# Patient Record
Sex: MALE | Race: WHITE | Employment: OTHER | URBAN - METROPOLITAN AREA
[De-identification: names, ages, dates, MRNs, and addresses within clinical notes are randomized per-mention and may not be internally consistent; named-entity substitution may affect disease eponyms.]

---

## 2022-08-29 ENCOUNTER — TELEPHONE (OUTPATIENT)
Dept: ENT CLINIC | Age: 87
End: 2022-08-29

## 2022-08-29 NOTE — TELEPHONE ENCOUNTER
Pt's wife returned phone call, she stated Dr. Melvi Rothman office advised her the appt with Dr. Severo Kilts was tom for 8/30/22 at 2:45, she did not know pt had an appt today. She would like to r/s for ASAP, pt is having pain in his ear. Pt will go to either office. Rosa Virk can be reached at [phone]. Thank you.

## 2022-08-29 NOTE — TELEPHONE ENCOUNTER
Pt's wife called in to check on the status of getting an appt, advised pt's wife to give office more time.

## 2022-08-30 ENCOUNTER — OFFICE VISIT (OUTPATIENT)
Dept: ENT CLINIC | Age: 87
End: 2022-08-30
Payer: MEDICARE

## 2022-08-30 VITALS
BODY MASS INDEX: 26.48 KG/M2 | HEART RATE: 68 BPM | HEIGHT: 70 IN | SYSTOLIC BLOOD PRESSURE: 126 MMHG | WEIGHT: 185 LBS | DIASTOLIC BLOOD PRESSURE: 73 MMHG

## 2022-08-30 DIAGNOSIS — H61.21 IMPACTED CERUMEN OF RIGHT EAR: ICD-10-CM

## 2022-08-30 DIAGNOSIS — H60.11 CELLULITIS OF RIGHT EAR: Primary | ICD-10-CM

## 2022-08-30 PROCEDURE — 1036F TOBACCO NON-USER: CPT | Performed by: NURSE PRACTITIONER

## 2022-08-30 PROCEDURE — 99203 OFFICE O/P NEW LOW 30 MIN: CPT | Performed by: NURSE PRACTITIONER

## 2022-08-30 PROCEDURE — 4130F TOPICAL PREP RX AOE: CPT | Performed by: NURSE PRACTITIONER

## 2022-08-30 PROCEDURE — 1123F ACP DISCUSS/DSCN MKR DOCD: CPT | Performed by: NURSE PRACTITIONER

## 2022-08-30 PROCEDURE — G8427 DOCREV CUR MEDS BY ELIG CLIN: HCPCS | Performed by: NURSE PRACTITIONER

## 2022-08-30 PROCEDURE — G8417 CALC BMI ABV UP PARAM F/U: HCPCS | Performed by: NURSE PRACTITIONER

## 2022-08-30 PROCEDURE — 69210 REMOVE IMPACTED EAR WAX UNI: CPT | Performed by: NURSE PRACTITIONER

## 2022-08-30 RX ORDER — MULTIVIT WITH MINERALS/LUTEIN
TABLET ORAL DAILY
COMMUNITY

## 2022-08-30 RX ORDER — PREDNISONE 10 MG/1
10 TABLET ORAL DAILY
Qty: 10 TABLET | Refills: 0 | Status: SHIPPED | OUTPATIENT
Start: 2022-08-30 | End: 2022-09-09

## 2022-08-30 RX ORDER — DOXYCYCLINE HYCLATE 100 MG
100 TABLET ORAL 2 TIMES DAILY
Qty: 20 TABLET | Refills: 0 | Status: SHIPPED | OUTPATIENT
Start: 2022-08-30 | End: 2022-09-09

## 2022-08-30 RX ORDER — LEVOTHYROXINE SODIUM 0.1 MG/1
88 TABLET ORAL DAILY
COMMUNITY

## 2022-08-30 RX ORDER — OMEPRAZOLE 20 MG/1
20 CAPSULE, DELAYED RELEASE ORAL 2 TIMES DAILY
COMMUNITY

## 2022-08-30 RX ORDER — VALACYCLOVIR HYDROCHLORIDE 500 MG/1
500 TABLET, FILM COATED ORAL PRN
COMMUNITY

## 2022-08-30 ASSESSMENT — ENCOUNTER SYMPTOMS
STRIDOR: 0
RHINORRHEA: 0
SINUS PAIN: 0
FACIAL SWELLING: 1
SINUS PRESSURE: 0
RESPIRATORY NEGATIVE: 1
EYES NEGATIVE: 1
SHORTNESS OF BREATH: 0

## 2022-08-30 NOTE — TELEPHONE ENCOUNTER
Spoke to Jorge from Dr [email or web address] office . Put pt in the wrong appointment date . Changed the appointment to today 8/30/22 at 2:45 pm in [place]. Called grecia and laxmi to Rissa Hernandez and explained what happened. Called wife and expalined what happened and that he is in for today at 2:45 pm at [facility].

## 2022-08-30 NOTE — PROGRESS NOTES
[facility] Otolaryngology  Dr. Amanda Traylor. KATY De La Vega Ms.Ed. New Consult       Patient Name:  Megan Ford  :  1934     CHIEF C/O:    Chief Complaint   Patient presents with    New Patient     NP Ear swelling/inflammation. HISTORY OBTAINED FROM:  patient, spouse    HISTORY OF PRESENT ILLNESS:       Inez Gan is a 80y.o. year old male, here today for right ear swelling, infection. Symptoms for 2 weeks  Went to have ears cleaned at audiology, told right ear was swollen and could not clean  Was seen at Urgent care, placed on cortisporin drops, has a mycin allergy, ear swelled after using. Stopped drops and saw PCP, given ofloxacin drops and cefdinir, did not start drops  No change from oral antibiotics  Continues right ear swelling, muffled hearing  Pain into right side of face  Denies drainage from the right ear  No previous hx of ear infections or previous ear surgeries        Past Medical History:   Diagnosis Date    Arthritis     Hyperlipidemia      Past Surgical History:   Procedure Laterality Date    ARM SURGERY Left     BACK SURGERY N/A     KNEE SURGERY Right     TONSILLECTOMY         Current Outpatient Medications:     Ascorbic Acid (VITAMIN C) 1000 MG tablet, Take by mouth daily, Disp: , Rfl:     levothyroxine (SYNTHROID) 100 MCG tablet, Take 88 mcg by mouth Daily, Disp: , Rfl:     omeprazole (PRILOSEC) 20 MG delayed release capsule, Take 20 mg by mouth in the morning and at bedtime, Disp: , Rfl:     valACYclovir (VALTREX) 500 MG tablet, Take 500 mg by mouth as needed, Disp: , Rfl:     brompheniramine-pseudoephedrine-DM 30-2-10 MG/5ML syrup, Take 5 mLs by mouth 4 times daily as needed. , Disp: 120 mL, Rfl: 0    simvastatin (ZOCOR) 20 MG tablet, Take 20 mg by mouth nightly.  , Disp: , Rfl:     celecoxib (CELEBREX) 200 MG capsule, Take 200 mg by mouth 2 times daily.   , Disp: , Rfl:   Clindamycin/lincomycin, Erythromycin, and Pcn [penicillins]  Social History     Tobacco Use    Smoking status: Never    Smokeless tobacco: Never   Substance Use Topics    Alcohol use: Yes     Alcohol/week: 4.0 standard drinks     Types: 4 Glasses of wine per week     Comment: beer    Drug use: Never     History reviewed. No pertinent family history. Review of Systems   Constitutional: Negative. Negative for activity change and appetite change. HENT:  Positive for ear pain (Swelling) and facial swelling. Negative for congestion, ear discharge, postnasal drip, rhinorrhea, sinus pressure and sinus pain. Eyes: Negative. Respiratory: Negative. Negative for shortness of breath and stridor. Cardiovascular: Negative. Negative for chest pain and palpitations. Endocrine: Negative. Musculoskeletal: Negative. Skin: Negative. Neurological: Negative. Negative for dizziness. Hematological: Negative. Psychiatric/Behavioral: Negative. /73   Pulse 68   Ht 5' 9.5\" (1.765 m)   Wt 185 lb (83.9 kg)   BMI 26.93 kg/m²   Physical Exam  Constitutional:       Appearance: Normal appearance. HENT:      Head: Normocephalic. Right Ear: Tympanic membrane and ear canal normal. Swelling present. There is impacted cerumen. Left Ear: Tympanic membrane, ear canal and external ear normal.      Ears:        Nose: Nose normal. No rhinorrhea. Right Nostril: No occlusion. Left Nostril: No occlusion. Right Turbinates: Not pale. Left Turbinates: Not pale. Mouth/Throat:      Lips: Pink. Pharynx: Oropharynx is clear. Eyes:      Conjunctiva/sclera: Conjunctivae normal.      Pupils: Pupils are equal, round, and reactive to light. Cardiovascular:      Rate and Rhythm: Normal rate and regular rhythm. Pulses: Normal pulses. Pulmonary:      Effort: Pulmonary effort is normal. No respiratory distress. Breath sounds: No stridor. Musculoskeletal:         General: Normal range of motion. Cervical back: Normal range of motion. No rigidity. No muscular tenderness. Skin:     General: Skin is warm and dry. Neurological:      General: No focal deficit present. Mental Status: He is alert and oriented to person, place, and time. Psychiatric:         Mood and Affect: Mood normal.         Behavior: Behavior normal.         Thought Content: Thought content normal.         Judgment: Judgment normal.     Right ear:      IMPRESSION/PLAN:    Michelet Llanes was seen today for new patient. Diagnoses and all orders for this visit:    Cellulitis of right ear    Impacted cerumen of right ear  -     ID REMOVAL IMPACTED CERUMEN INSTRUMENTATION UNILAT    Other orders  -     doxycycline hyclate (VIBRA-TABS) 100 MG tablet; Take 1 tablet by mouth 2 times daily for 10 days  -     predniSONE (DELTASONE) 10 MG tablet; Take 1 tablet by mouth daily for 10 days    Patient is seen and examined today for swelling of the right ear. Upon exam there is significant swelling of the right external ear. The ear is fluctuant on palpation. Skin of the right ear is dry and scaly. Thorough examination of the right ear canal reveals complete cerumen impaction that was removed without difficulty. Patient does state significant improvement in his hearing at this time. There is no erythema or drainage in the ear canal at this time. Patient did provide verbal consent for photography of his right ear for documentation purposes. At this time he will be placed on doxycycline, 100 mg twice daily for 10 days as well as prednisone, 10 mg 1 tablet once daily for 10 days. Also recommend use of Kenalog cream that the patient already has to the external ear for dryness and swelling. He will follow-up in 2 weeks for reevaluation of his symptoms. He is instructed to call with any new or worsening of symptoms prior to his next appointment.       Evan Ho, MSN, FNP-C  [facility], Nose and Throat    The information contained in this note has been dictated using drug and medical speech recognition software and may contain errors

## 2022-09-13 ENCOUNTER — OFFICE VISIT (OUTPATIENT)
Dept: ENT CLINIC | Age: 87
End: 2022-09-13
Payer: MEDICARE

## 2022-09-13 VITALS
HEIGHT: 70 IN | DIASTOLIC BLOOD PRESSURE: 74 MMHG | BODY MASS INDEX: 25.77 KG/M2 | HEART RATE: 66 BPM | SYSTOLIC BLOOD PRESSURE: 124 MMHG | WEIGHT: 180 LBS

## 2022-09-13 DIAGNOSIS — H92.11 OTORRHEA, RIGHT EAR: Primary | ICD-10-CM

## 2022-09-13 DIAGNOSIS — H92.11 OTORRHEA, RIGHT EAR: ICD-10-CM

## 2022-09-13 DIAGNOSIS — H60.391 OTHER INFECTIVE ACUTE OTITIS EXTERNA OF RIGHT EAR: ICD-10-CM

## 2022-09-13 DIAGNOSIS — H60.11 CELLULITIS OF RIGHT EAR: ICD-10-CM

## 2022-09-13 PROCEDURE — 4130F TOPICAL PREP RX AOE: CPT | Performed by: NURSE PRACTITIONER

## 2022-09-13 PROCEDURE — 1123F ACP DISCUSS/DSCN MKR DOCD: CPT | Performed by: NURSE PRACTITIONER

## 2022-09-13 PROCEDURE — G8427 DOCREV CUR MEDS BY ELIG CLIN: HCPCS | Performed by: NURSE PRACTITIONER

## 2022-09-13 PROCEDURE — G8417 CALC BMI ABV UP PARAM F/U: HCPCS | Performed by: NURSE PRACTITIONER

## 2022-09-13 PROCEDURE — 1036F TOBACCO NON-USER: CPT | Performed by: NURSE PRACTITIONER

## 2022-09-13 PROCEDURE — 99213 OFFICE O/P EST LOW 20 MIN: CPT | Performed by: NURSE PRACTITIONER

## 2022-09-13 ASSESSMENT — ENCOUNTER SYMPTOMS
SHORTNESS OF BREATH: 0
SINUS PRESSURE: 0
EYES NEGATIVE: 1
STRIDOR: 0
SINUS PAIN: 0
RESPIRATORY NEGATIVE: 1
FACIAL SWELLING: 0
RHINORRHEA: 0

## 2022-09-13 NOTE — PROGRESS NOTES
[facility] Otolaryngology  Dr. Sheryl Huang. Abigail Alvarado. Ms.Ed        Patient Name:  Trinity Stewart  :  1934     CHIEF C/O:    Chief Complaint   Patient presents with    Follow-up     Pt states right ear is still irritated and moist        HISTORY OBTAINED FROM:  patient    HISTORY OF PRESENT ILLNESS:       Ernst Marquez is a 80y.o. year old male, here today for follow up of right ear swelling, drainage. Last seen 2 weeks ago, started on Doxy and prednisone  Does note decreased swelling and discomfort of the right ear  Right ear canal still feels itchy with drainage  No new fevers   Denies current pain  Does have ofloxacin drops at home but did not start using        Past Medical History:   Diagnosis Date    Arthritis     Hyperlipidemia      Past Surgical History:   Procedure Laterality Date    ARM SURGERY Left     BACK SURGERY N/A     KNEE SURGERY Right     TONSILLECTOMY         Current Outpatient Medications:     Ascorbic Acid (VITAMIN C) 1000 MG tablet, Take by mouth daily, Disp: , Rfl:     levothyroxine (SYNTHROID) 100 MCG tablet, Take 88 mcg by mouth Daily, Disp: , Rfl:     omeprazole (PRILOSEC) 20 MG delayed release capsule, Take 20 mg by mouth in the morning and at bedtime, Disp: , Rfl:     valACYclovir (VALTREX) 500 MG tablet, Take 500 mg by mouth as needed, Disp: , Rfl:     brompheniramine-pseudoephedrine-DM 30-2-10 MG/5ML syrup, Take 5 mLs by mouth 4 times daily as needed. , Disp: 120 mL, Rfl: 0    simvastatin (ZOCOR) 20 MG tablet, Take 20 mg by mouth nightly.  , Disp: , Rfl:     celecoxib (CELEBREX) 200 MG capsule, Take 200 mg by mouth 2 times daily. , Disp: , Rfl:   Clindamycin/lincomycin, Erythromycin, and Pcn [penicillins]  Social History     Tobacco Use    Smoking status: Never    Smokeless tobacco: Never   Substance Use Topics    Alcohol use: Yes     Alcohol/week: 4.0 standard drinks     Types: 4 Glasses of wine per week     Comment: beer    Drug use: Never     History reviewed.  No pertinent family history. Review of Systems   Constitutional: Negative. Negative for activity change and appetite change. HENT:  Positive for ear discharge. Negative for congestion, ear pain, facial swelling, postnasal drip, rhinorrhea, sinus pressure and sinus pain. Mild swelling remains on right external ear   Eyes: Negative. Respiratory: Negative. Negative for shortness of breath and stridor. Cardiovascular: Negative. Negative for chest pain and palpitations. Endocrine: Negative. Musculoskeletal: Negative. Skin: Negative. Neurological: Negative. Negative for dizziness. Hematological: Negative. Psychiatric/Behavioral: Negative. /74 (Site: Left Upper Arm, Position: Sitting, Cuff Size: Large Adult)   Pulse 66   Ht 5' 9.5\" (1.765 m)   Wt 180 lb (81.6 kg)   BMI 26.20 kg/m²   Physical Exam  Constitutional:       Appearance: Normal appearance. HENT:      Head: Normocephalic. Right Ear: Tympanic membrane and ear canal normal. Drainage and swelling present. There is impacted cerumen. Left Ear: Tympanic membrane, ear canal and external ear normal.      Ears:        Nose: Nose normal. No rhinorrhea. Right Nostril: No occlusion. Left Nostril: No occlusion. Right Turbinates: Not pale. Left Turbinates: Not pale. Mouth/Throat:      Lips: Pink. Pharynx: Oropharynx is clear. Eyes:      Conjunctiva/sclera: Conjunctivae normal.      Pupils: Pupils are equal, round, and reactive to light. Cardiovascular:      Rate and Rhythm: Normal rate and regular rhythm. Pulses: Normal pulses. Pulmonary:      Effort: Pulmonary effort is normal. No respiratory distress. Breath sounds: No stridor. Musculoskeletal:         General: Normal range of motion. Cervical back: Normal range of motion. No rigidity. No muscular tenderness. Skin:     General: Skin is warm and dry. Neurological:      General: No focal deficit present. Mental Status: He is alert and oriented to person, place, and time. Psychiatric:         Mood and Affect: Mood normal.         Behavior: Behavior normal.         Thought Content: Thought content normal.         Judgment: Judgment normal.       IMPRESSION/PLAN:    Mercedes Shabazz was seen today for follow-up. Diagnoses and all orders for this visit:    Otorrhea, right ear  -     Culture, Ear/Eye; Future    Other infective acute otitis externa of right ear    Cellulitis of right ear    At this time a culture is obtained of the right ear drainage. Patient is instructed to begin using his ofloxacin drops, 4 drops to the right ear twice daily for 7 days. He is instructed that he will be notified of culture results and any necessary medication changes. He will otherwise follow-up in 2 weeks. He is instructed to call with any new or worsening symptoms prior to his next appointment.       BRIGITTE Vasquez, FNP-C  [address] [place] [address], Nose and Throat    The information contained in this note has been dictated using drug and medical speech recognition software and may contain errors

## 2022-09-16 LAB
CULTURE EAR OR EYE: ABNORMAL
CULTURE EAR OR EYE: ABNORMAL
GRAM STAIN RESULT: ABNORMAL
ORGANISM: ABNORMAL

## 2022-09-16 NOTE — PROGRESS NOTES
Pt notified Culture returned back positive for Corynebacterium and Enterococcus infection. Notified patient to  continue with his ofloxacin drops for 14 days and notify the office for any worsening of symptoms Pt verbalized understanding and will call if worsening symptom

## 2022-09-16 NOTE — PROGRESS NOTES
Culture returned back positive for Corynebacterium and Enterococcus infection. Have patient continue with his ofloxacin drops for 14 days and notify the office for any worsening of symptoms.

## 2022-09-28 ENCOUNTER — OFFICE VISIT (OUTPATIENT)
Dept: ENT CLINIC | Age: 87
End: 2022-09-28
Payer: MEDICARE

## 2022-09-28 VITALS — BODY MASS INDEX: 25.77 KG/M2 | HEIGHT: 70 IN | WEIGHT: 180 LBS

## 2022-09-28 DIAGNOSIS — H92.11 OTORRHEA, RIGHT EAR: ICD-10-CM

## 2022-09-28 DIAGNOSIS — H60.391 OTHER INFECTIVE ACUTE OTITIS EXTERNA OF RIGHT EAR: Primary | ICD-10-CM

## 2022-09-28 PROCEDURE — G8427 DOCREV CUR MEDS BY ELIG CLIN: HCPCS | Performed by: NURSE PRACTITIONER

## 2022-09-28 PROCEDURE — 99213 OFFICE O/P EST LOW 20 MIN: CPT | Performed by: NURSE PRACTITIONER

## 2022-09-28 PROCEDURE — 1123F ACP DISCUSS/DSCN MKR DOCD: CPT | Performed by: NURSE PRACTITIONER

## 2022-09-28 PROCEDURE — 1036F TOBACCO NON-USER: CPT | Performed by: NURSE PRACTITIONER

## 2022-09-28 PROCEDURE — 4130F TOPICAL PREP RX AOE: CPT | Performed by: NURSE PRACTITIONER

## 2022-09-28 PROCEDURE — 92504 EAR MICROSCOPY EXAMINATION: CPT | Performed by: NURSE PRACTITIONER

## 2022-09-28 PROCEDURE — G8417 CALC BMI ABV UP PARAM F/U: HCPCS | Performed by: NURSE PRACTITIONER

## 2022-09-28 ASSESSMENT — ENCOUNTER SYMPTOMS
SHORTNESS OF BREATH: 0
FACIAL SWELLING: 0
EYES NEGATIVE: 1
RESPIRATORY NEGATIVE: 1
STRIDOR: 0
RHINORRHEA: 0
SINUS PAIN: 0
SINUS PRESSURE: 0

## 2022-09-28 NOTE — PROGRESS NOTES
[facility] Otolaryngology  Dr. Brenden Arrieta. Brittny Chris. Ms.Ed        Patient Name:  Yane Slater  :  1934     CHIEF C/O:    Chief Complaint   Patient presents with    Follow-up     Patient states that once and while the right ear has a minor itch but mostly cleared up. States that he feels much better. HISTORY OBTAINED FROM:  patient    HISTORY OF PRESENT ILLNESS:       Mckayla Montes De Oca is a 80y.o. year old male, here today for follow up of right otitis externa. Patient was last seen 2 weeks ago with an ear culture obtained from the right ear returning positive for corynebacterium and Enterococcus faecalis. Patient continued with his ofloxacin drops for a total of 14 days and states that his symptoms have improved. He denies any current pain or drainage from the right ear. He does complain of mild itching at this time. He states his hearing has returned to normal.  Denies any further pain or swelling to the right external ear. He is doing well at this time with no new complaints. Past Medical History:   Diagnosis Date    Arthritis     Hyperlipidemia      Past Surgical History:   Procedure Laterality Date    ARM SURGERY Left     BACK SURGERY N/A     KNEE SURGERY Right     TONSILLECTOMY         Current Outpatient Medications:     Ascorbic Acid (VITAMIN C) 1000 MG tablet, Take by mouth daily, Disp: , Rfl:     levothyroxine (SYNTHROID) 100 MCG tablet, Take 88 mcg by mouth Daily, Disp: , Rfl:     omeprazole (PRILOSEC) 20 MG delayed release capsule, Take 20 mg by mouth in the morning and at bedtime, Disp: , Rfl:     valACYclovir (VALTREX) 500 MG tablet, Take 500 mg by mouth as needed, Disp: , Rfl:     brompheniramine-pseudoephedrine-DM 30-2-10 MG/5ML syrup, Take 5 mLs by mouth 4 times daily as needed. , Disp: 120 mL, Rfl: 0    simvastatin (ZOCOR) 20 MG tablet, Take 20 mg by mouth nightly.  , Disp: , Rfl:     celecoxib (CELEBREX) 200 MG capsule, Take 200 mg by mouth 2 times daily.   , Disp: , Rfl: Clindamycin/lincomycin, Erythromycin, and Pcn [penicillins]  Social History     Tobacco Use    Smoking status: Never    Smokeless tobacco: Never   Substance Use Topics    Alcohol use: Yes     Alcohol/week: 4.0 standard drinks     Types: 4 Glasses of wine per week     Comment: beer    Drug use: Never     History reviewed. No pertinent family history. Review of Systems   Constitutional: Negative. Negative for activity change and appetite change. HENT:  Negative for congestion, ear discharge, ear pain, facial swelling, postnasal drip, rhinorrhea, sinus pressure and sinus pain. Mild swelling remains on right external ear, mild itching within right ear canal   Eyes: Negative. Respiratory: Negative. Negative for shortness of breath and stridor. Cardiovascular: Negative. Negative for chest pain and palpitations. Endocrine: Negative. Musculoskeletal: Negative. Skin: Negative. Neurological: Negative. Negative for dizziness. Hematological: Negative. Psychiatric/Behavioral: Negative. Ht 5' 9.5\" (1.765 m)   Wt 180 lb (81.6 kg)   BMI 26.20 kg/m²   Physical Exam  Constitutional:       Appearance: Normal appearance. HENT:      Head: Normocephalic. Right Ear: Tympanic membrane and ear canal normal. No drainage or swelling. There is no impacted cerumen. Left Ear: Tympanic membrane, ear canal and external ear normal.      Ears:        Nose: Nose normal. No rhinorrhea. Mouth/Throat:      Lips: Pink. Eyes:      Conjunctiva/sclera: Conjunctivae normal.      Pupils: Pupils are equal, round, and reactive to light. Cardiovascular:      Rate and Rhythm: Normal rate and regular rhythm. Pulses: Normal pulses. Pulmonary:      Effort: Pulmonary effort is normal. No respiratory distress. Breath sounds: No stridor. Musculoskeletal:         General: Normal range of motion. Cervical back: Normal range of motion. No rigidity. No muscular tenderness.    Skin: General: Skin is warm and dry. Neurological:      General: No focal deficit present. Mental Status: He is alert and oriented to person, place, and time. Psychiatric:         Mood and Affect: Mood normal.         Behavior: Behavior normal.         Thought Content: Thought content normal.         Judgment: Judgment normal.       IMPRESSION/PLAN:    Ear Microsopy:  Right ear reevaluated under microscope with small amount of cerumen and epithelial debris removed using #7 suction. Patient tolerated well. There is no further erythema or drainage at this time. Michelet Llanes was seen today for follow-up. Diagnoses and all orders for this visit:    Other infective acute otitis externa of right ear  -     ME EAR MICROSCOPY EXAMINATION    Otorrhea, right ear  -     ME EAR MICROSCOPY EXAMINATION      comfortableRight ear examined under microscope with small amount of cerumen and epithelial debris removed using suction. Patient tolerated well. At this time patient is instructed to begin using all of oil every 2 to 3 days for moisture and itching of the right ear canal.  He will also keep his remaining drops and restart for any new pain or drainage from the right ear with instructions to call the office for the symptoms. He will otherwise follow-up as needed. He is instructed to call with any new or worsening of symptoms in the future.       Evan Ho, MSN, FNP-C  [facility], Nose and Throat    The information contained in this note has been dictated using drug and medical speech recognition software and may contain errors

## 2023-01-11 ENCOUNTER — OFFICE VISIT (OUTPATIENT)
Dept: ENT CLINIC | Age: 88
End: 2023-01-11
Payer: MEDICARE

## 2023-01-11 VITALS — WEIGHT: 180 LBS | BODY MASS INDEX: 25.2 KG/M2 | HEIGHT: 71 IN

## 2023-01-11 DIAGNOSIS — H60.391 OTHER INFECTIVE ACUTE OTITIS EXTERNA OF RIGHT EAR: ICD-10-CM

## 2023-01-11 DIAGNOSIS — H60.391 OTHER INFECTIVE ACUTE OTITIS EXTERNA OF RIGHT EAR: Primary | ICD-10-CM

## 2023-01-11 PROCEDURE — G8427 DOCREV CUR MEDS BY ELIG CLIN: HCPCS | Performed by: NURSE PRACTITIONER

## 2023-01-11 PROCEDURE — 4130F TOPICAL PREP RX AOE: CPT | Performed by: NURSE PRACTITIONER

## 2023-01-11 PROCEDURE — G8420 CALC BMI NORM PARAMETERS: HCPCS | Performed by: NURSE PRACTITIONER

## 2023-01-11 PROCEDURE — 1036F TOBACCO NON-USER: CPT | Performed by: NURSE PRACTITIONER

## 2023-01-11 PROCEDURE — 99213 OFFICE O/P EST LOW 20 MIN: CPT | Performed by: NURSE PRACTITIONER

## 2023-01-11 PROCEDURE — G8484 FLU IMMUNIZE NO ADMIN: HCPCS | Performed by: NURSE PRACTITIONER

## 2023-01-11 PROCEDURE — 1123F ACP DISCUSS/DSCN MKR DOCD: CPT | Performed by: NURSE PRACTITIONER

## 2023-01-11 RX ORDER — METHYLPREDNISOLONE 4 MG/1
4 TABLET ORAL SEE ADMIN INSTRUCTIONS
Qty: 1 KIT | Refills: 0 | Status: SHIPPED | OUTPATIENT
Start: 2023-01-11 | End: 2023-01-17

## 2023-01-11 ASSESSMENT — ENCOUNTER SYMPTOMS
STRIDOR: 0
RESPIRATORY NEGATIVE: 1
SINUS PRESSURE: 0
FACIAL SWELLING: 0
SINUS PAIN: 0
EYES NEGATIVE: 1
SHORTNESS OF BREATH: 0
RHINORRHEA: 0

## 2023-01-11 NOTE — PROGRESS NOTES
[facility]  Dr. Quinten Parra. Juni Parks. Ms.Ed        Patient Name:  Claudene Colon  :  1934     CHIEF C/O:    Chief Complaint   Patient presents with    Follow-up     F/U Rt ear fluid/swelling       HISTORY OBTAINED FROM:  patient    HISTORY OF PRESENT ILLNESS:       David Watkins is a 80y.o. year old male, here today for follow up of swelling and drainage from the right ear. Patient was last seen in 2022 after an extended otitis externa and cellulitis of the right external ear. He states that 2 days ago he noticed return of drainage that is yellow in color from the right ear canal and swelling of the ear canal.  He does have discomfort of the right ear especially when the laying on the ear. He denies any significant redness of the outer ear or warmth. He does note mild swelling to the outer ear at this time. He does complain of mild muffled hearing due to the swelling of the ear canal.  He does have Cipro drops from his previous infection which she started 2 days ago but has not noticed significant improvement during that time.         Past Medical History:   Diagnosis Date    Arthritis     Hyperlipidemia      Past Surgical History:   Procedure Laterality Date    ARM SURGERY Left     BACK SURGERY N/A     KNEE SURGERY Right     TONSILLECTOMY         Current Outpatient Medications:     methylPREDNISolone (MEDROL DOSEPACK) 4 MG tablet, Take 1 tablet by mouth See Admin Instructions for 6 days Take by mouth., Disp: 1 kit, Rfl: 0    Ascorbic Acid (VITAMIN C) 1000 MG tablet, Take by mouth daily, Disp: , Rfl:     levothyroxine (SYNTHROID) 100 MCG tablet, Take 88 mcg by mouth Daily, Disp: , Rfl:     omeprazole (PRILOSEC) 20 MG delayed release capsule, Take 20 mg by mouth in the morning and at bedtime, Disp: , Rfl:     valACYclovir (VALTREX) 500 MG tablet, Take 500 mg by mouth as needed, Disp: , Rfl:     brompheniramine-pseudoephedrine-DM 30-2-10 MG/5ML syrup, Take 5 mLs by mouth 4 times daily as needed. , Disp: 120 mL, Rfl: 0    simvastatin (ZOCOR) 20 MG tablet, Take 20 mg by mouth nightly.  , Disp: , Rfl:     celecoxib (CELEBREX) 200 MG capsule, Take 200 mg by mouth 2 times daily. , Disp: , Rfl:   Clindamycin/lincomycin, Erythromycin, and Pcn [penicillins]  Social History     Tobacco Use    Smoking status: Never    Smokeless tobacco: Never   Substance Use Topics    Alcohol use: Yes     Alcohol/week: 4.0 standard drinks     Types: 4 Glasses of wine per week     Comment: beer    Drug use: Never     History reviewed. No pertinent family history. Review of Systems   Constitutional: Negative. Negative for activity change and appetite change. HENT:  Positive for ear discharge, ear pain and hearing loss (muffled right ear). Negative for congestion, facial swelling, postnasal drip, rhinorrhea, sinus pressure and sinus pain. Mild swelling remains on right external ear, mild itching within right ear canal   Eyes: Negative. Respiratory: Negative. Negative for shortness of breath and stridor. Cardiovascular: Negative. Negative for chest pain and palpitations. Endocrine: Negative. Musculoskeletal: Negative. Skin: Negative. Neurological: Negative. Negative for dizziness. Hematological: Negative. Psychiatric/Behavioral: Negative. Ht 5' 11.4\" (1.814 m)   Wt 180 lb (81.6 kg)   BMI 24.82 kg/m²   Physical Exam  Constitutional:       Appearance: Normal appearance. HENT:      Head: Normocephalic. Right Ear: Tympanic membrane and ear canal normal. Drainage and swelling present. There is no impacted cerumen. Left Ear: Tympanic membrane, ear canal and external ear normal.      Ears:        Nose: Nose normal. No rhinorrhea. Right Nostril: No occlusion. Left Nostril: No occlusion. Right Turbinates: Not pale. Left Turbinates: Not pale. Mouth/Throat:      Lips: Pink. Pharynx: Oropharynx is clear.    Eyes:      Conjunctiva/sclera: Conjunctivae normal.      Pupils: Pupils are equal, round, and reactive to light. Cardiovascular:      Rate and Rhythm: Normal rate and regular rhythm. Pulses: Normal pulses. Pulmonary:      Effort: Pulmonary effort is normal. No respiratory distress. Breath sounds: No stridor. Musculoskeletal:         General: Normal range of motion. Cervical back: Normal range of motion. No rigidity. No muscular tenderness. Skin:     General: Skin is warm and dry. Neurological:      General: No focal deficit present. Mental Status: He is alert and oriented to person, place, and time. Psychiatric:         Mood and Affect: Mood normal.         Behavior: Behavior normal.         Thought Content: Thought content normal.         Judgment: Judgment normal.       IMPRESSION/PLAN:    Maddison Rogers was seen today for follow-up. Diagnoses and all orders for this visit:    Other infective acute otitis externa of right ear  -     Culture, Ear/Eye; Future    Other orders  -     methylPREDNISolone (MEDROL DOSEPACK) 4 MG tablet; Take 1 tablet by mouth See Admin Instructions for 6 days Take by mouth. A culture was obtained of the drainage from the right ear canal and sent to lab for further evaluation. At this time an ear wick was placed within the right ear canal with 4 drops of the patient's provided Cipro drops. He will continue with Cipro drops, 4 drops twice daily until his follow-up appointment. He is instructed that if the wick falls out of the ear not to replace it but continue the drop  He is also a Medrol Dosepak to be used as directed. He is to call the office for any changes including increased swelling of the external ear with redness or dried flaking skin as in previous episode. He will otherwise follow-up in 7 to 10 days for reevaluation. He is instructed to call with any new or worsening symptoms prior to his next appointment.     Jordyn Warren, MSN, FNP-C  [address] [facility] and Throat    The information contained in this note has been dictated using drug and medical speech recognition software and may contain errors

## 2023-01-16 ENCOUNTER — TELEPHONE (OUTPATIENT)
Dept: ENT CLINIC | Age: 88
End: 2023-01-16

## 2023-01-16 NOTE — TELEPHONE ENCOUNTER
Received fax from lab, called to F/U. Lab did not receive specimen only the order? ? Specimen needs to be recollected. Can Pt just stop in office to recollect or does he need to be on schedule? Please advise.

## 2023-01-23 ENCOUNTER — PROCEDURE VISIT (OUTPATIENT)
Dept: AUDIOLOGY | Age: 88
End: 2023-01-23
Payer: MEDICARE

## 2023-01-23 ENCOUNTER — OFFICE VISIT (OUTPATIENT)
Dept: ENT CLINIC | Age: 88
End: 2023-01-23
Payer: MEDICARE

## 2023-01-23 ENCOUNTER — TELEPHONE (OUTPATIENT)
Dept: ENT CLINIC | Age: 88
End: 2023-01-23

## 2023-01-23 VITALS
BODY MASS INDEX: 25.1 KG/M2 | DIASTOLIC BLOOD PRESSURE: 75 MMHG | SYSTOLIC BLOOD PRESSURE: 125 MMHG | HEART RATE: 76 BPM | HEIGHT: 71 IN

## 2023-01-23 DIAGNOSIS — H92.11 OTORRHEA, RIGHT EAR: ICD-10-CM

## 2023-01-23 DIAGNOSIS — H93.8X1 EAR FULLNESS, RIGHT: Primary | ICD-10-CM

## 2023-01-23 DIAGNOSIS — H60.391 OTHER INFECTIVE ACUTE OTITIS EXTERNA OF RIGHT EAR: ICD-10-CM

## 2023-01-23 DIAGNOSIS — H93.8X1 SWELLING OF RIGHT EAR: Primary | ICD-10-CM

## 2023-01-23 PROCEDURE — 1123F ACP DISCUSS/DSCN MKR DOCD: CPT | Performed by: NURSE PRACTITIONER

## 2023-01-23 PROCEDURE — 92567 TYMPANOMETRY: CPT | Performed by: AUDIOLOGIST

## 2023-01-23 PROCEDURE — 99213 OFFICE O/P EST LOW 20 MIN: CPT | Performed by: NURSE PRACTITIONER

## 2023-01-23 PROCEDURE — 4130F TOPICAL PREP RX AOE: CPT | Performed by: NURSE PRACTITIONER

## 2023-01-23 PROCEDURE — G8484 FLU IMMUNIZE NO ADMIN: HCPCS | Performed by: NURSE PRACTITIONER

## 2023-01-23 PROCEDURE — G8427 DOCREV CUR MEDS BY ELIG CLIN: HCPCS | Performed by: NURSE PRACTITIONER

## 2023-01-23 PROCEDURE — 1036F TOBACCO NON-USER: CPT | Performed by: NURSE PRACTITIONER

## 2023-01-23 PROCEDURE — G8417 CALC BMI ABV UP PARAM F/U: HCPCS | Performed by: NURSE PRACTITIONER

## 2023-01-23 ASSESSMENT — ENCOUNTER SYMPTOMS
FACIAL SWELLING: 0
SINUS PAIN: 0
EYES NEGATIVE: 1
SINUS PRESSURE: 0
STRIDOR: 0
RESPIRATORY NEGATIVE: 1
SHORTNESS OF BREATH: 0
RHINORRHEA: 0

## 2023-01-23 NOTE — PROGRESS NOTES
[facility] Otolaryngology  Dr. Yolis Adler. Dot Ferrell. Ms.Ed        Patient Name:  Brennan Cardenas  :  1934     CHIEF C/O:    Chief Complaint   Patient presents with    Other     Right swollen ear       HISTORY OBTAINED FROM:  patient    HISTORY OF PRESENT ILLNESS:       Antoni Gage is a 80y.o. year old male, here today for follow up of right otitis externa and ear swelling. Patient was last seen 2 weeks ago and placed on Cipro drops and a Medrol Dosepak. An ear wick was placed within the right ear canal.  A culture was obtained from the right ear however this sample was lost prior to being received by the lab. Patient states decreased swelling and drainage from the right ear with decreased pain. He does note some continued cracking and popping from the right ear with minimal discharge. He does note decrease in the swelling in the ear canal and outer ear. He denies any recent fevers or oral antibiotics. He has no new complaints today. Past Medical History:   Diagnosis Date    Arthritis     Hyperlipidemia      Past Surgical History:   Procedure Laterality Date    ARM SURGERY Left     BACK SURGERY N/A     KNEE SURGERY Right     TONSILLECTOMY         Current Outpatient Medications:     Ascorbic Acid (VITAMIN C) 1000 MG tablet, Take by mouth daily, Disp: , Rfl:     levothyroxine (SYNTHROID) 100 MCG tablet, Take 88 mcg by mouth Daily, Disp: , Rfl:     omeprazole (PRILOSEC) 20 MG delayed release capsule, Take 20 mg by mouth in the morning and at bedtime, Disp: , Rfl:     valACYclovir (VALTREX) 500 MG tablet, Take 500 mg by mouth as needed, Disp: , Rfl:     brompheniramine-pseudoephedrine-DM 30-2-10 MG/5ML syrup, Take 5 mLs by mouth 4 times daily as needed. , Disp: 120 mL, Rfl: 0    simvastatin (ZOCOR) 20 MG tablet, Take 20 mg by mouth nightly.  , Disp: , Rfl:     celecoxib (CELEBREX) 200 MG capsule, Take 200 mg by mouth 2 times daily.   , Disp: , Rfl:   Clindamycin/lincomycin, Erythromycin, and Pcn [penicillins]  Social History     Tobacco Use    Smoking status: Never    Smokeless tobacco: Never   Substance Use Topics    Alcohol use: Yes     Alcohol/week: 4.0 standard drinks     Types: 4 Glasses of wine per week     Comment: beer    Drug use: Never     History reviewed. No pertinent family history. Review of Systems   Constitutional: Negative. Negative for activity change and appetite change. HENT:  Positive for ear discharge, ear pain and hearing loss. Negative for congestion, facial swelling, postnasal drip, rhinorrhea, sinus pressure and sinus pain. Eyes: Negative. Respiratory: Negative. Negative for shortness of breath and stridor. Cardiovascular: Negative. Negative for chest pain and palpitations. Endocrine: Negative. Musculoskeletal: Negative. Skin: Negative. Neurological: Negative. Negative for dizziness. Hematological: Negative. Psychiatric/Behavioral: Negative. /75 (Site: Left Upper Arm, Position: Sitting, Cuff Size: Large Adult)   Pulse 76   Ht 5' 11\" (1.803 m)   BMI 25.10 kg/m²   Physical Exam  Constitutional:       Appearance: Normal appearance. HENT:      Head: Normocephalic. Right Ear: Tympanic membrane and ear canal normal. Drainage present. No swelling. There is no impacted cerumen. Left Ear: Tympanic membrane, ear canal and external ear normal.      Ears:        Nose: Nose normal. No rhinorrhea. Right Nostril: No occlusion. Left Nostril: No occlusion. Right Turbinates: Not pale. Left Turbinates: Not pale. Mouth/Throat:      Lips: Pink. Pharynx: Oropharynx is clear. Eyes:      Conjunctiva/sclera: Conjunctivae normal.      Pupils: Pupils are equal, round, and reactive to light. Cardiovascular:      Rate and Rhythm: Normal rate and regular rhythm. Pulses: Normal pulses. Pulmonary:      Effort: Pulmonary effort is normal. No respiratory distress. Breath sounds: No stridor.    Musculoskeletal: General: Normal range of motion. Cervical back: Normal range of motion. No rigidity. No muscular tenderness. Skin:     General: Skin is warm and dry. Neurological:      General: No focal deficit present. Mental Status: He is alert and oriented to person, place, and time. Psychiatric:         Mood and Affect: Mood normal.         Behavior: Behavior normal.         Thought Content: Thought content normal.         Judgment: Judgment normal.       Tympanogram reviewed with patient. Reveals type A curve in the right ear, with type A curve in the left ear. IMPRESSION/PLAN:    Ear Microscopy:  Right ear canal examined under microscope. There is a moderate amount of epithelial debris, dead skin, and purulent drainage within the ear canal that was removed using #5 suction. Patient tolerated well and states improvement in his hearing. He denies significant pain or tenderness during the procedure. Kervin Perry was seen today for other. Diagnoses and all orders for this visit:    Otorrhea, right ear  -     Culture, Ear/Eye; Future    Other infective acute otitis externa of right ear    At this time patient will continue with his Cipro drops, 4 drops twice daily until notified of your culture results. A new culture was obtained of the right ear canal and sent to the laboratory for further evaluation. He will be notified of results. Patient states he is preparing to travel out of town in 1 week but will adjust based on his treatment. At this time he will plan to follow-up as needed.   He will call for any new or worsening symptoms    Brice Quinonez, BRIGITTE, FNP-C  8 [facility], Nose and Throat    The information contained in this note has been dictated using drug and medical speech recognition software and may contain errors

## 2023-01-23 NOTE — PROGRESS NOTES
This patient was referred for tympanometric testing by KENYON Washington due to external/ear canal swelling. Tympanometry revealed normal middle ear peak pressure and compliance, in the left ear and normal middle ear peak pressure and reduced compliance, right ear. The results were reviewed with the patient. Recommendations for follow up will be made pending physician consult.     Kirill Clifton CCC/GAL  Audiologist  [id number]  NPI#:  [id number]      Electronically signed by Ashley Conn on 1/23/2023 at 11:45 AM

## 2023-01-24 RX ORDER — CIPROFLOXACIN HYDROCHLORIDE 3.5 MG/ML
4 SOLUTION/ DROPS TOPICAL 2 TIMES DAILY
Qty: 1 EACH | Refills: 3 | Status: SHIPPED | OUTPATIENT
Start: 2023-01-24 | End: 2023-01-31

## 2023-01-25 ENCOUNTER — TELEPHONE (OUTPATIENT)
Dept: ENT CLINIC | Age: 88
End: 2023-01-25

## 2023-01-25 LAB
CULTURE EAR OR EYE: ABNORMAL
GRAM STAIN RESULT: ABNORMAL
ORGANISM: ABNORMAL

## 2023-01-25 RX ORDER — SULFAMETHOXAZOLE AND TRIMETHOPRIM 800; 160 MG/1; MG/1
1 TABLET ORAL 2 TIMES DAILY
Qty: 20 TABLET | Refills: 0 | Status: SHIPPED | OUTPATIENT
Start: 2023-01-25 | End: 2023-02-04

## 2023-01-25 NOTE — TELEPHONE ENCOUNTER
Your culture returned positive for staph. Patient to be placed on Bactrim, 1 tablet twice daily for 10 days. He will continue with his Cipro drops during this time. Keep follow-up as scheduled.

## 2023-01-27 ENCOUNTER — TELEPHONE (OUTPATIENT)
Dept: ENT CLINIC | Age: 88
End: 2023-01-27

## 2023-01-27 ENCOUNTER — OFFICE VISIT (OUTPATIENT)
Dept: ENT CLINIC | Age: 88
End: 2023-01-27
Payer: MEDICARE

## 2023-01-27 VITALS — BODY MASS INDEX: 25.2 KG/M2 | WEIGHT: 180 LBS | HEIGHT: 71 IN

## 2023-01-27 DIAGNOSIS — H60.391 OTHER INFECTIVE ACUTE OTITIS EXTERNA OF RIGHT EAR: Primary | ICD-10-CM

## 2023-01-27 DIAGNOSIS — H92.11 OTORRHEA, RIGHT EAR: ICD-10-CM

## 2023-01-27 PROCEDURE — G8427 DOCREV CUR MEDS BY ELIG CLIN: HCPCS | Performed by: NURSE PRACTITIONER

## 2023-01-27 PROCEDURE — 4130F TOPICAL PREP RX AOE: CPT | Performed by: NURSE PRACTITIONER

## 2023-01-27 PROCEDURE — 1036F TOBACCO NON-USER: CPT | Performed by: NURSE PRACTITIONER

## 2023-01-27 PROCEDURE — G8484 FLU IMMUNIZE NO ADMIN: HCPCS | Performed by: NURSE PRACTITIONER

## 2023-01-27 PROCEDURE — 1123F ACP DISCUSS/DSCN MKR DOCD: CPT | Performed by: NURSE PRACTITIONER

## 2023-01-27 PROCEDURE — 99213 OFFICE O/P EST LOW 20 MIN: CPT | Performed by: NURSE PRACTITIONER

## 2023-01-27 PROCEDURE — G8417 CALC BMI ABV UP PARAM F/U: HCPCS | Performed by: NURSE PRACTITIONER

## 2023-01-27 RX ORDER — MUPIROCIN CALCIUM 20 MG/G
CREAM TOPICAL
Qty: 1 EACH | Refills: 0 | Status: SHIPPED | OUTPATIENT
Start: 2023-01-27 | End: 2023-02-26

## 2023-01-27 ASSESSMENT — ENCOUNTER SYMPTOMS
SHORTNESS OF BREATH: 0
RESPIRATORY NEGATIVE: 1
RHINORRHEA: 0
SINUS PAIN: 0
EYES NEGATIVE: 1
FACIAL SWELLING: 0
SINUS PRESSURE: 0
STRIDOR: 0

## 2023-01-27 NOTE — PROGRESS NOTES
[address] [facility] Otolaryngology  Dr. Michael Linton. Jordyn Fuchs. Ms.Ed        Patient Name:  Mary Kate Pineda  :  1934     CHIEF C/O:    Chief Complaint   Patient presents with    Follow-up     Right ear culture re check. Sates that he has noticed improvements already        HISTORY OBTAINED FROM:  patient    HISTORY OF PRESENT ILLNESS:       Sebas Goodwin is a 80y.o. year old male, here today for follow up of right otitis externa. Patient was last seen 5 days ago with an ear culture returning back positive for staph. He was placed on Bactrim which she has been taking for 2 and half days and notes improvement of his symptoms already. He continues to note mild drainage from the right ear but denies any significant pain or swelling at this time. He denies any muffled hearing. He denies any new tinnitus. He states his symptoms are improving and he feels well. Past Medical History:   Diagnosis Date    Arthritis     Hyperlipidemia      Past Surgical History:   Procedure Laterality Date    ARM SURGERY Left     BACK SURGERY N/A     KNEE SURGERY Right     TONSILLECTOMY         Current Outpatient Medications:     mupirocin (BACTROBAN) 2 % cream, Apply to right ear canal twice daily for 10 days. , Disp: 1 each, Rfl: 0    sulfamethoxazole-trimethoprim (BACTRIM DS;SEPTRA DS) 800-160 MG per tablet, Take 1 tablet by mouth 2 times daily for 10 days, Disp: 20 tablet, Rfl: 0    ciprofloxacin (CILOXAN) 0.3 % ophthalmic solution, Place 4 drops in ear(s) 2 times daily for 7 days, Disp: 1 each, Rfl: 3    Ascorbic Acid (VITAMIN C) 1000 MG tablet, Take by mouth daily, Disp: , Rfl:     levothyroxine (SYNTHROID) 100 MCG tablet, Take 88 mcg by mouth Daily, Disp: , Rfl:     omeprazole (PRILOSEC) 20 MG delayed release capsule, Take 20 mg by mouth in the morning and at bedtime, Disp: , Rfl:     valACYclovir (VALTREX) 500 MG tablet, Take 500 mg by mouth as needed, Disp: , Rfl:     brompheniramine-pseudoephedrine-DM 30-2-10 MG/5ML syrup, Take 5 mLs by mouth 4 times daily as needed. , Disp: 120 mL, Rfl: 0    simvastatin (ZOCOR) 20 MG tablet, Take 20 mg by mouth nightly.  , Disp: , Rfl:     celecoxib (CELEBREX) 200 MG capsule, Take 200 mg by mouth 2 times daily. , Disp: , Rfl:   Clindamycin/lincomycin, Erythromycin, and Pcn [penicillins]  Social History     Tobacco Use    Smoking status: Never    Smokeless tobacco: Never   Substance Use Topics    Alcohol use: Yes     Alcohol/week: 4.0 standard drinks     Types: 4 Glasses of wine per week     Comment: beer    Drug use: Never     History reviewed. No pertinent family history. Review of Systems   Constitutional: Negative. Negative for activity change and appetite change. HENT:  Positive for ear discharge and hearing loss. Negative for congestion, ear pain, facial swelling, postnasal drip, rhinorrhea, sinus pressure and sinus pain. Eyes: Negative. Respiratory: Negative. Negative for shortness of breath and stridor. Cardiovascular: Negative. Negative for chest pain and palpitations. Endocrine: Negative. Musculoskeletal: Negative. Skin: Negative. Neurological: Negative. Negative for dizziness. Hematological: Negative. Psychiatric/Behavioral: Negative. Ht 5' 11\" (1.803 m)   Wt 180 lb (81.6 kg)   BMI 25.10 kg/m²   Physical Exam  Constitutional:       Appearance: Normal appearance. HENT:      Head: Normocephalic. Right Ear: Tympanic membrane and ear canal normal. Drainage present. No swelling. There is no impacted cerumen. Left Ear: Tympanic membrane, ear canal and external ear normal.      Ears:        Nose: Nose normal. No rhinorrhea. Right Nostril: No occlusion. Left Nostril: No occlusion. Right Turbinates: Not pale. Left Turbinates: Not pale. Mouth/Throat:      Lips: Pink. Pharynx: Oropharynx is clear. Eyes:      Conjunctiva/sclera: Conjunctivae normal.      Pupils: Pupils are equal, round, and reactive to light.    Cardiovascular: Rate and Rhythm: Normal rate and regular rhythm. Pulses: Normal pulses. Pulmonary:      Effort: Pulmonary effort is normal. No respiratory distress. Breath sounds: No stridor. Musculoskeletal:         General: Normal range of motion. Cervical back: Normal range of motion. No rigidity. No muscular tenderness. Skin:     General: Skin is warm and dry. Neurological:      General: No focal deficit present. Mental Status: He is alert and oriented to person, place, and time. Psychiatric:         Mood and Affect: Mood normal.         Behavior: Behavior normal.         Thought Content: Thought content normal.         Judgment: Judgment normal.       IMPRESSION/PLAN:    Afua Coronado was seen today for follow-up. Diagnoses and all orders for this visit:    Other infective acute otitis externa of right ear    Otorrhea, right ear    Other orders  -     mupirocin (BACTROBAN) 2 % cream; Apply to right ear canal twice daily for 10 days. At this time patient will continue with his Bactrim, 1 tablet twice daily until completed. He is also given Bactroban cream to apply to the right ear twice daily for 10 days. Patient is scheduled to be out of town for the next 2 to 3 months and will follow-up upon his return in approximately May. He is to call for any new or worsening of symptoms prior to his next appointment.       Neel Almaraz, BRIGITTE, FNP-C  8 [facility], Nose and Throat    The information contained in this note has been dictated using drug and medical speech recognition software and may contain errors

## 2023-04-05 ENCOUNTER — OFFICE VISIT (OUTPATIENT)
Dept: ENT CLINIC | Age: 88
End: 2023-04-05
Payer: MEDICARE

## 2023-04-05 VITALS
BODY MASS INDEX: 25.2 KG/M2 | HEART RATE: 92 BPM | HEIGHT: 71 IN | WEIGHT: 180 LBS | DIASTOLIC BLOOD PRESSURE: 79 MMHG | SYSTOLIC BLOOD PRESSURE: 132 MMHG

## 2023-04-05 DIAGNOSIS — H60.391 OTHER INFECTIVE ACUTE OTITIS EXTERNA OF RIGHT EAR: Primary | ICD-10-CM

## 2023-04-05 DIAGNOSIS — H93.8X1 EAR FULLNESS, RIGHT: ICD-10-CM

## 2023-04-05 DIAGNOSIS — H61.23 BILATERAL IMPACTED CERUMEN: ICD-10-CM

## 2023-04-05 PROCEDURE — G8427 DOCREV CUR MEDS BY ELIG CLIN: HCPCS | Performed by: NURSE PRACTITIONER

## 2023-04-05 PROCEDURE — 1036F TOBACCO NON-USER: CPT | Performed by: NURSE PRACTITIONER

## 2023-04-05 PROCEDURE — 99213 OFFICE O/P EST LOW 20 MIN: CPT | Performed by: NURSE PRACTITIONER

## 2023-04-05 PROCEDURE — 69210 REMOVE IMPACTED EAR WAX UNI: CPT | Performed by: NURSE PRACTITIONER

## 2023-04-05 PROCEDURE — G8417 CALC BMI ABV UP PARAM F/U: HCPCS | Performed by: NURSE PRACTITIONER

## 2023-04-05 PROCEDURE — 1123F ACP DISCUSS/DSCN MKR DOCD: CPT | Performed by: NURSE PRACTITIONER

## 2023-04-05 PROCEDURE — 4130F TOPICAL PREP RX AOE: CPT | Performed by: NURSE PRACTITIONER

## 2023-04-05 ASSESSMENT — ENCOUNTER SYMPTOMS
SINUS PRESSURE: 0
EYES NEGATIVE: 1
SINUS PAIN: 0
SHORTNESS OF BREATH: 0
STRIDOR: 0
RHINORRHEA: 0
RESPIRATORY NEGATIVE: 1
FACIAL SWELLING: 0

## 2023-04-05 NOTE — PROGRESS NOTES
Substance Use Topics    Alcohol use: Yes     Alcohol/week: 4.0 standard drinks     Types: 4 Glasses of wine per week     Comment: beer    Drug use: Never     History reviewed. No pertinent family history. Review of Systems   Constitutional: Negative. Negative for activity change and appetite change. HENT:  Positive for hearing loss (Muffled right ear). Negative for congestion, ear discharge, ear pain, facial swelling, postnasal drip, rhinorrhea, sinus pressure and sinus pain. Eyes: Negative. Respiratory: Negative. Negative for shortness of breath and stridor. Cardiovascular: Negative. Negative for chest pain and palpitations. Endocrine: Negative. Musculoskeletal: Negative. Skin: Negative. Neurological: Negative. Negative for dizziness. Hematological: Negative. Psychiatric/Behavioral: Negative. /79   Pulse 92   Ht 5' 11\" (1.803 m)   Wt 180 lb (81.6 kg)   BMI 25.10 kg/m²   Physical Exam  Constitutional:       Appearance: Normal appearance. HENT:      Head: Normocephalic. Right Ear: Tympanic membrane and ear canal normal. No drainage or swelling. There is impacted cerumen. Left Ear: Tympanic membrane, ear canal and external ear normal. There is impacted cerumen. Nose: Nose normal. No rhinorrhea. Right Nostril: No occlusion. Left Nostril: No occlusion. Right Turbinates: Not pale. Left Turbinates: Not pale. Mouth/Throat:      Lips: Pink. Pharynx: Oropharynx is clear. Eyes:      Conjunctiva/sclera: Conjunctivae normal.      Pupils: Pupils are equal, round, and reactive to light. Cardiovascular:      Rate and Rhythm: Normal rate and regular rhythm. Pulses: Normal pulses. Pulmonary:      Effort: Pulmonary effort is normal. No respiratory distress. Breath sounds: No stridor. Musculoskeletal:         General: Normal range of motion. Cervical back: Normal range of motion. No rigidity.  No muscular

## 2023-04-27 ENCOUNTER — OFFICE VISIT (OUTPATIENT)
Dept: ENT CLINIC | Age: 88
End: 2023-04-27
Payer: MEDICARE

## 2023-04-27 VITALS
WEIGHT: 180.2 LBS | DIASTOLIC BLOOD PRESSURE: 73 MMHG | BODY MASS INDEX: 25.8 KG/M2 | HEIGHT: 70 IN | SYSTOLIC BLOOD PRESSURE: 123 MMHG | HEART RATE: 85 BPM

## 2023-04-27 DIAGNOSIS — H60.391 OTHER INFECTIVE ACUTE OTITIS EXTERNA OF RIGHT EAR: Primary | ICD-10-CM

## 2023-04-27 DIAGNOSIS — H92.11 OTORRHEA, RIGHT EAR: ICD-10-CM

## 2023-04-27 PROCEDURE — G8427 DOCREV CUR MEDS BY ELIG CLIN: HCPCS | Performed by: NURSE PRACTITIONER

## 2023-04-27 PROCEDURE — 99212 OFFICE O/P EST SF 10 MIN: CPT | Performed by: NURSE PRACTITIONER

## 2023-04-27 PROCEDURE — 4130F TOPICAL PREP RX AOE: CPT | Performed by: NURSE PRACTITIONER

## 2023-04-27 PROCEDURE — G8417 CALC BMI ABV UP PARAM F/U: HCPCS | Performed by: NURSE PRACTITIONER

## 2023-04-27 PROCEDURE — 1123F ACP DISCUSS/DSCN MKR DOCD: CPT | Performed by: NURSE PRACTITIONER

## 2023-04-27 PROCEDURE — 1036F TOBACCO NON-USER: CPT | Performed by: NURSE PRACTITIONER

## 2023-04-27 NOTE — PROGRESS NOTES
[facility] Otolaryngology  Dr. Noel Mosley. Regi Castano. Ms.Ed        Patient Name:  Chapis Sadler  :  1934     CHIEF C/O:    Chief Complaint   Patient presents with    Follow-up     2 week follow up cx results        HISTORY OBTAINED FROM:  patient    HISTORY OF PRESENT ILLNESS:       Jayshree Pelayo is a 80y.o. year old male, here today for follow up of right otitis externa and ear culture results. Patient was seen 230s ago and had external ear which returned back positive for Corynebacterium. Patient completed additional 7 to 10 days of Cipro drops and currently complains of no pain drainage or itching from the right ear. Denies any muffled hearing or fullness. He states he is doing well and has no other complaints at this time. Past Medical History:   Diagnosis Date    Arthritis     Hyperlipidemia      Past Surgical History:   Procedure Laterality Date    ARM SURGERY Left     BACK SURGERY N/A     KNEE SURGERY Right     TONSILLECTOMY         Current Outpatient Medications:     Ascorbic Acid (VITAMIN C) 1000 MG tablet, Take by mouth daily, Disp: , Rfl:     levothyroxine (SYNTHROID) 100 MCG tablet, Take 88 mcg by mouth Daily, Disp: , Rfl:     omeprazole (PRILOSEC) 20 MG delayed release capsule, Take 1 capsule by mouth in the morning and at bedtime, Disp: , Rfl:     valACYclovir (VALTREX) 500 MG tablet, Take 1 tablet by mouth as needed, Disp: , Rfl:     brompheniramine-pseudoephedrine-DM 30-2-10 MG/5ML syrup, Take 5 mLs by mouth 4 times daily as needed. , Disp: 120 mL, Rfl: 0    simvastatin (ZOCOR) 20 MG tablet, Take 1 tablet by mouth nightly, Disp: , Rfl:     celecoxib (CELEBREX) 200 MG capsule, Take 1 capsule by mouth 2 times daily, Disp: , Rfl:   Clindamycin/lincomycin, Erythromycin, and Pcn [penicillins]  Social History     Tobacco Use    Smoking status: Never    Smokeless tobacco: Never   Substance Use Topics    Alcohol use:  Yes     Alcohol/week: 4.0 standard drinks     Types: 4 Glasses of wine per week
SBIRT referral

## 2023-08-24 ENCOUNTER — OFFICE VISIT (OUTPATIENT)
Dept: ENT CLINIC | Age: 88
End: 2023-08-24
Payer: MEDICARE

## 2023-08-24 VITALS
HEIGHT: 70 IN | SYSTOLIC BLOOD PRESSURE: 108 MMHG | WEIGHT: 171.4 LBS | BODY MASS INDEX: 24.54 KG/M2 | HEART RATE: 60 BPM | DIASTOLIC BLOOD PRESSURE: 69 MMHG

## 2023-08-24 DIAGNOSIS — Z86.69 HISTORY OF OTITIS EXTERNA: ICD-10-CM

## 2023-08-24 DIAGNOSIS — H61.23 EXCESSIVE CERUMEN IN BOTH EAR CANALS: Primary | ICD-10-CM

## 2023-08-24 PROCEDURE — G8427 DOCREV CUR MEDS BY ELIG CLIN: HCPCS | Performed by: NURSE PRACTITIONER

## 2023-08-24 PROCEDURE — 1123F ACP DISCUSS/DSCN MKR DOCD: CPT | Performed by: NURSE PRACTITIONER

## 2023-08-24 PROCEDURE — 1036F TOBACCO NON-USER: CPT | Performed by: NURSE PRACTITIONER

## 2023-08-24 PROCEDURE — G8420 CALC BMI NORM PARAMETERS: HCPCS | Performed by: NURSE PRACTITIONER

## 2023-08-24 PROCEDURE — 99213 OFFICE O/P EST LOW 20 MIN: CPT | Performed by: NURSE PRACTITIONER

## 2023-08-24 RX ORDER — TRIAMCINOLONE ACETONIDE 0.25 MG/G
CREAM TOPICAL
Qty: 1 EACH | Refills: 1 | Status: SHIPPED | OUTPATIENT
Start: 2023-08-24

## 2023-08-24 ASSESSMENT — ENCOUNTER SYMPTOMS
EYES NEGATIVE: 1
SHORTNESS OF BREATH: 0
RESPIRATORY NEGATIVE: 1
STRIDOR: 0

## 2023-08-24 NOTE — PROGRESS NOTES
muscular tenderness. Skin:     General: Skin is warm and dry. Neurological:      General: No focal deficit present. Mental Status: He is alert and oriented to person, place, and time. Psychiatric:         Mood and Affect: Mood normal.         Behavior: Behavior normal.         Thought Content: Thought content normal.         Judgment: Judgment normal.       IMPRESSION/PLAN:    Levar Mims was seen today for follow-up. Diagnoses and all orders for this visit:    Excessive cerumen in both ear canals    History of otitis externa    Other orders  -     triamcinolone (KENALOG) 0.025 % cream; Apply Topically to affected ear(s) 2-3 times weekly for dryness/itching. Stop when symptoms resolved        This time patient to given Kenalog cream to be applied to the right ear for itching or clear drainage.   He will follow-up in 6 months for recheck of the ears and cerumen removal.  Call for any new or worsening symptoms prior to his next appointment      Ciara Weeks, MSN, FNP-C  [address] [place] [address], Nose and Throat    The information contained in this note has been dictated using drug and medical speech recognition software and may contain errors

## 2024-04-25 ENCOUNTER — OFFICE VISIT (OUTPATIENT)
Dept: ENT CLINIC | Age: 89
End: 2024-04-25
Payer: MEDICARE

## 2024-04-25 VITALS
SYSTOLIC BLOOD PRESSURE: 118 MMHG | WEIGHT: 169 LBS | BODY MASS INDEX: 24.25 KG/M2 | DIASTOLIC BLOOD PRESSURE: 72 MMHG | HEART RATE: 69 BPM

## 2024-04-25 DIAGNOSIS — H61.23 BILATERAL IMPACTED CERUMEN: Primary | ICD-10-CM

## 2024-04-25 PROCEDURE — 69210 REMOVE IMPACTED EAR WAX UNI: CPT | Performed by: NURSE PRACTITIONER

## 2024-04-25 NOTE — PROGRESS NOTES
Subjective:      Patient ID:  Sonido Medina is a 89 y.o. male.    HPI:    Pt presents with a history of cerumen impaction removal.   The patients ear was last cleaned 8 month(s) ago.   The patient was not using ear drops to loosen wax immediately prior to this visit.      Hearing aids: no      Past Medical History:   Diagnosis Date    Arthritis     Hyperlipidemia     Keratosis 08/10/2023    face     Past Surgical History:   Procedure Laterality Date    ARM SURGERY Left     BACK SURGERY N/A     KNEE SURGERY Right     TONSILLECTOMY       History reviewed. No pertinent family history.  Social History     Socioeconomic History    Marital status:      Spouse name: None    Number of children: None    Years of education: None    Highest education level: None   Tobacco Use    Smoking status: Never    Smokeless tobacco: Never   Substance and Sexual Activity    Alcohol use: Yes     Alcohol/week: 4.0 standard drinks of alcohol     Types: 4 Glasses of wine per week     Comment: beer    Drug use: Never     Allergies   Allergen Reactions    Clindamycin/Lincomycin Rash    Erythromycin Rash     Allergy to all mycin's    Pcn [Penicillins] Rash       Review of Systems            Objective:     Vitals:    04/25/24 0959   BP: 118/72   Pulse: 69     Physical Exam        Cerumen removal     Auditory canal(s) both ears completely obstructed with cerumen.  A microscope was used . Cerumen was gently removed using soft plastic curette, suction. Tympanic membranes are intact following the procedure. Auditory canals appear normal.            Assessment:       Diagnosis Orders   1. Bilateral impacted cerumen  TX REMOVAL IMPACTED CERUMEN INSTRUMENTATION UNILAT                 Plan:      Bilateral cerumen impaction removed without difficulty.  Patient tolerated well.  At this time he will follow-up again in 6 months for repeat cleaning.  Will call for any new or worsening symptoms prior to his next appointment.      Braxton Hines,

## 2024-10-24 ENCOUNTER — OFFICE VISIT (OUTPATIENT)
Dept: ENT CLINIC | Age: 89
End: 2024-10-24
Payer: MEDICARE

## 2024-10-24 VITALS
DIASTOLIC BLOOD PRESSURE: 65 MMHG | HEART RATE: 71 BPM | BODY MASS INDEX: 24.08 KG/M2 | SYSTOLIC BLOOD PRESSURE: 129 MMHG | WEIGHT: 167.8 LBS

## 2024-10-24 DIAGNOSIS — H61.23 BILATERAL IMPACTED CERUMEN: Primary | ICD-10-CM

## 2024-10-24 PROCEDURE — 69210 REMOVE IMPACTED EAR WAX UNI: CPT | Performed by: NURSE PRACTITIONER

## 2024-10-24 RX ORDER — PREDNISONE 20 MG/1
20 TABLET ORAL DAILY
COMMUNITY
Start: 2024-10-09

## 2024-10-24 NOTE — PROGRESS NOTES
Subjective:      Patient ID:  Sonido Medina is a 89 y.o. male.    HPI:    Pt presents with a history of cerumen impaction removal.   The patients ear was last cleaned 6 month(s) ago.   The patient was not using ear drops to loosen wax immediately prior to this visit.      Hearing aids: no      Past Medical History:   Diagnosis Date    Arthritis     Hyperlipidemia     Keratosis 08/10/2023    face     Past Surgical History:   Procedure Laterality Date    ARM SURGERY Left     BACK SURGERY N/A     KNEE SURGERY Right     TONSILLECTOMY       History reviewed. No pertinent family history.  Social History     Socioeconomic History    Marital status:      Spouse name: None    Number of children: None    Years of education: None    Highest education level: None   Tobacco Use    Smoking status: Never    Smokeless tobacco: Never   Substance and Sexual Activity    Alcohol use: Yes     Alcohol/week: 4.0 standard drinks of alcohol     Types: 4 Glasses of wine per week     Comment: beer    Drug use: Never     Allergies   Allergen Reactions    Clindamycin/Lincomycin Rash    Erythromycin Rash     Allergy to all mycin's    Pcn [Penicillins] Rash       Review of Systems            Objective:     Vitals:    10/24/24 0953   BP: 129/65   Pulse: 71     Physical Exam        Cerumen removal     Auditory canal(s) both ears partially obstructed with cerumen.  A microscope was used . Cerumen was gently removed using soft plastic curette, suction. Tympanic membranes are intact following the procedure. Auditory canals appear normal.            Assessment:       Diagnosis Orders   1. Bilateral impacted cerumen                   Plan:        Bilateral cerumen impactions removed out difficulty.  Patient tolerated well.  At this time he will follow-up again in 6 months for repeat cleaning.  Will call for any new or worsening symptoms prior to his next appointment.      Braxton Hines, MSN, FNP-C  [facility] - Ear, Nose and

## 2025-04-30 ENCOUNTER — OFFICE VISIT (OUTPATIENT)
Dept: ENT CLINIC | Age: 89
End: 2025-04-30
Payer: MEDICARE

## 2025-04-30 VITALS
BODY MASS INDEX: 24.05 KG/M2 | HEIGHT: 70 IN | SYSTOLIC BLOOD PRESSURE: 128 MMHG | HEART RATE: 64 BPM | WEIGHT: 168 LBS | DIASTOLIC BLOOD PRESSURE: 67 MMHG

## 2025-04-30 DIAGNOSIS — H61.23 BILATERAL IMPACTED CERUMEN: Primary | ICD-10-CM

## 2025-04-30 PROCEDURE — 69210 REMOVE IMPACTED EAR WAX UNI: CPT | Performed by: NURSE PRACTITIONER

## 2025-04-30 RX ORDER — UPADACITINIB 15 MG/1
15 TABLET, EXTENDED RELEASE ORAL
COMMUNITY
Start: 2024-12-19

## 2025-04-30 NOTE — PROGRESS NOTES
DEPARTMENT OF OTOLARYNGOLOGY  JUAN MANUEL PrattO., Ms. Ed.  JUAN MANUEL FernandesO.  Braxton Hines, MSN, FNP-C          Subjective:      Patient ID:  Sonido Medina is a 89 y.o. male.    HPI:    Pt presents with a history of cerumen impaction removal.   The patients ear was last cleaned 6 month(s) ago.   The patient was not using ear drops to loosen wax immediately prior to this visit.      Hearing aids: no      Past Medical History:   Diagnosis Date    Arthritis     Hyperlipidemia     Keratosis 08/10/2023    face     Past Surgical History:   Procedure Laterality Date    ARM SURGERY Left     BACK SURGERY N/A     KNEE SURGERY Right     TONSILLECTOMY       History reviewed. No pertinent family history.  Social History     Socioeconomic History    Marital status:      Spouse name: None    Number of children: None    Years of education: None    Highest education level: None   Tobacco Use    Smoking status: Never    Smokeless tobacco: Never   Substance and Sexual Activity    Alcohol use: Yes     Alcohol/week: 4.0 standard drinks of alcohol     Types: 4 Glasses of wine per week     Comment: beer    Drug use: Never     Allergies   Allergen Reactions    Clindamycin/Lincomycin Rash    Erythromycin Rash     Allergy to all mycin's    Pcn [Penicillins] Rash       Review of Systems            Objective:     Vitals:    04/30/25 1017   BP: 128/67   Pulse: 64     Physical Exam        Cerumen removal     Auditory canal(s) both ears partially obstructed with cerumen.  A microscope was used . Cerumen was gently removed using soft plastic curette, suction. Tympanic membranes are intact following the procedure. Auditory canals appear normal.            Assessment:      Sonido was seen today for follow-up.    Diagnoses and all orders for this visit:    Bilateral impacted cerumen  -     REMOVE IMPACTED EAR WAX                 Plan:      Bilateral cerumen impactions removed without difficulty.  Patient tolerated well.  
oral

## 2025-05-08 ENCOUNTER — OFFICE VISIT (OUTPATIENT)
Dept: ONCOLOGY | Age: 89
End: 2025-05-08
Payer: MEDICARE

## 2025-05-08 ENCOUNTER — HOSPITAL ENCOUNTER (OUTPATIENT)
Dept: INFUSION THERAPY | Age: 89
Discharge: HOME | End: 2025-05-08
Payer: MEDICARE

## 2025-05-08 ENCOUNTER — TELEPHONE (OUTPATIENT)
Dept: INFUSION THERAPY | Age: 89
End: 2025-05-08

## 2025-05-08 VITALS
TEMPERATURE: 97.4 F | DIASTOLIC BLOOD PRESSURE: 53 MMHG | HEIGHT: 70 IN | OXYGEN SATURATION: 100 % | SYSTOLIC BLOOD PRESSURE: 114 MMHG | WEIGHT: 168.5 LBS | HEART RATE: 58 BPM | BODY MASS INDEX: 24.12 KG/M2

## 2025-05-08 DIAGNOSIS — D64.9 ANEMIA, UNSPECIFIED TYPE: ICD-10-CM

## 2025-05-08 DIAGNOSIS — D64.9 ANEMIA, UNSPECIFIED TYPE: Primary | ICD-10-CM

## 2025-05-08 LAB
ABO + RH BLD: NORMAL
ALBUMIN SERPL-MCNC: 4.8 G/DL (ref 3.5–5.2)
ALP SERPL-CCNC: 70 U/L (ref 40–129)
ALT SERPL-CCNC: 22 U/L (ref 0–40)
ANION GAP SERPL CALCULATED.3IONS-SCNC: 11 MMOL/L (ref 7–16)
ARM BAND NUMBER: NORMAL
AST SERPL-CCNC: 20 U/L (ref 0–39)
BASOPHILS # BLD: 0.44 K/UL (ref 0–0.2)
BASOPHILS NFR BLD: 9 % (ref 0–2)
BILIRUB SERPL-MCNC: 2.2 MG/DL (ref 0–1.2)
BLOOD BANK SAMPLE EXPIRATION: NORMAL
BLOOD GROUP ANTIBODIES SERPL: NEGATIVE
BUN SERPL-MCNC: 18 MG/DL (ref 6–23)
CALCIUM SERPL-MCNC: 9.1 MG/DL (ref 8.6–10.2)
CHLORIDE SERPL-SCNC: 103 MMOL/L (ref 98–107)
CO2 SERPL-SCNC: 25 MMOL/L (ref 22–29)
CREAT SERPL-MCNC: 0.9 MG/DL (ref 0.7–1.2)
EOSINOPHIL # BLD: 0.15 K/UL (ref 0.05–0.5)
EOSINOPHILS RELATIVE PERCENT: 3 % (ref 0–6)
ERYTHROCYTE [DISTWIDTH] IN BLOOD BY AUTOMATED COUNT: 25.4 % (ref 11.5–15)
GFR, ESTIMATED: 82 ML/MIN/1.73M2
GLUCOSE SERPL-MCNC: 107 MG/DL (ref 74–99)
HCT VFR BLD AUTO: 22.8 % (ref 37–54)
HGB BLD-MCNC: 7.4 G/DL (ref 12.5–16.5)
LYMPHOCYTES NFR BLD: 3.09 K/UL (ref 1.5–4)
LYMPHOCYTES RELATIVE PERCENT: 63 % (ref 20–42)
MCH RBC QN AUTO: 34.9 PG (ref 26–35)
MCHC RBC AUTO-ENTMCNC: 32.5 G/DL (ref 32–34.5)
MCV RBC AUTO: 107.5 FL (ref 80–99.9)
MONOCYTES NFR BLD: 0.64 K/UL (ref 0.1–0.95)
MONOCYTES NFR BLD: 13 % (ref 2–12)
NEUTROPHILS NFR BLD: 12 % (ref 43–80)
NEUTS SEG NFR BLD: 0.59 K/UL (ref 1.8–7.3)
NUCLEATED RED BLOOD CELLS: 2 PER 100 WBC
PLATELET # BLD AUTO: 320 K/UL (ref 130–450)
PMV BLD AUTO: 11.5 FL (ref 7–12)
POTASSIUM SERPL-SCNC: 4.2 MMOL/L (ref 3.5–5)
PROT SERPL-MCNC: 7.2 G/DL (ref 6.4–8.3)
RBC # BLD AUTO: 2.12 M/UL (ref 3.8–5.8)
RBC # BLD: ABNORMAL 10*6/UL
SEND OUT REPORT: NORMAL
SODIUM SERPL-SCNC: 139 MMOL/L (ref 132–146)
TEST NAME: NORMAL
WBC OTHER # BLD: 4.9 K/UL (ref 4.5–11.5)

## 2025-05-08 PROCEDURE — 85025 COMPLETE CBC W/AUTO DIFF WBC: CPT

## 2025-05-08 PROCEDURE — 86900 BLOOD TYPING SEROLOGIC ABO: CPT

## 2025-05-08 PROCEDURE — 80053 COMPREHEN METABOLIC PANEL: CPT

## 2025-05-08 PROCEDURE — 88185 FLOWCYTOMETRY/TC ADD-ON: CPT

## 2025-05-08 PROCEDURE — 86901 BLOOD TYPING SEROLOGIC RH(D): CPT

## 2025-05-08 PROCEDURE — 86850 RBC ANTIBODY SCREEN: CPT

## 2025-05-08 PROCEDURE — 88184 FLOWCYTOMETRY/ TC 1 MARKER: CPT

## 2025-05-08 PROCEDURE — 36415 COLL VENOUS BLD VENIPUNCTURE: CPT

## 2025-05-08 PROCEDURE — 99214 OFFICE O/P EST MOD 30 MIN: CPT

## 2025-05-08 NOTE — TELEPHONE ENCOUNTER
Hgb 7.4. Per Dr. Yanez, no blood transfusion at this time. Patient to repeat labs in one week. Patient verbalized understanding.  Angelica Rose, SANGEETAN, RN, OCN  5/8/25 6912

## 2025-05-08 NOTE — PROGRESS NOTES
[facility]  [address] [place] [address]   Hematology/Oncology  Consult      Patient Name: Sonido Medina  YOB: 1934  PCP: Braxton Perrin MD   Referring Provider:      Reason for Consultation:   Chief Complaint   Patient presents with    Anemia    New Patient        History of Present Illness:  89-year-old male referred for anemia evaluation.  Labs from April 2025 show a hemoglobin of 7.5 with an MCV of 105, WBC 5.3, platelets 326.  WBC differential notable for mild neutropenia ANC of 1000.  Absolute reticulocyte count 25,000.  Chemistry unremarkable ferritin 680 iron saturation 96%.  Folate and B12 normal.  TSH normal.    Patient reports normal blood count in April last year.  Started Rinvoq in November 2024.  Noted anemia for the first time a month after starting Rinvoq.    Review of systems: Over 10 systems were reviewed and all were negative except as mentioned above.      Diagnostic Data:     Past Medical History:   Diagnosis Date    Anemia     Arthritis     Hyperlipidemia     Keratosis 08/10/2023    face       There are no active problems to display for this patient.       Past Surgical History:   Procedure Laterality Date    ARM SURGERY Left     BACK SURGERY N/A     KNEE SURGERY Right     replacement    NOSE SURGERY      broken nose    TONSILLECTOMY         Family History  Family History   Problem Relation Age of Onset    Breast Cancer Mother     Cancer Father         throat    Breast Cancer Sister     Cancer Brother         colon    Cancer Brother         prostate       Social History    TOBACCO:   reports that he has never smoked. He has never used smokeless tobacco.  ETOH:   reports current alcohol use of about 4.0 standard drinks of alcohol per week.    Home Medications  Prior to Admission medications    Medication Sig Start Date End Date Taking? Authorizing Provider   Omega-3 Fatty Acids (FISH OIL) 300 MG CAPS Take by mouth   Yes Provider, MD Guadalupe

## 2025-05-08 NOTE — PROGRESS NOTES
Sonido Medina  1934 89 y.o.      Referring Physician:     PCP: Braxton Perrin MD    Vitals:    25 0903   BP: (!) 114/53   Pulse: 58   Temp: 97.4 °F (36.3 °C)   SpO2: 100%        Wt Readings from Last 3 Encounters:   25 76.4 kg (168 lb 8 oz)   25 76.2 kg (168 lb)   10/24/24 76.1 kg (167 lb 12.8 oz)        Body mass index is 24.18 kg/m².          Chief Complaint:   Chief Complaint   Patient presents with    Anemia    New Patient          Cancer Staging   No matching staging information was found for the patient.      Prior Radiation Therapy? NO    Concurrent Chemo/radiation? NO    Prior Chemotherapy? NO    Prior Hormonal Therapy? NO    Head and Neck Cancer? No, patient does NOT have HN cancer.      LMP: na    Age at first Menses: na    : na    Para: na          Current Outpatient Medications:     Omega-3 Fatty Acids (FISH OIL) 300 MG CAPS, Take by mouth, Disp: , Rfl:     Upadacitinib ER (RINVOQ) 15 MG TB24, Take 1 tablet by mouth, Disp: , Rfl:     triamcinolone (KENALOG) 0.025 % cream, Apply Topically to affected ear(s) 2-3 times weekly for dryness/itching. Stop when symptoms resolved, Disp: 1 each, Rfl: 1    Ascorbic Acid (VITAMIN C) 1000 MG tablet, Take by mouth daily, Disp: , Rfl:     levothyroxine (SYNTHROID) 100 MCG tablet, Take 88 mcg by mouth Daily, Disp: , Rfl:     omeprazole (PRILOSEC) 20 MG delayed release capsule, Take 1 capsule by mouth in the morning and at bedtime, Disp: , Rfl:     valACYclovir (VALTREX) 500 MG tablet, Take 1 tablet by mouth as needed, Disp: , Rfl:     brompheniramine-pseudoephedrine-DM 30-2-10 MG/5ML syrup, Take 5 mLs by mouth 4 times daily as needed., Disp: 120 mL, Rfl: 0    simvastatin (ZOCOR) 20 MG tablet, Take 1 tablet by mouth nightly, Disp: , Rfl:     celecoxib (CELEBREX) 200 MG capsule, Take 1 capsule by mouth 2 times daily, Disp: , Rfl:     predniSONE (DELTASONE) 20 MG tablet, Take 1 tablet by mouth daily (Patient not taking: Reported on

## 2025-05-09 LAB — PATH REV BLD -IMP: NORMAL

## 2025-05-12 LAB
SEND OUT REPORT: NORMAL
TEST NAME: NORMAL

## 2025-05-22 ENCOUNTER — HOSPITAL ENCOUNTER (OUTPATIENT)
Dept: INFUSION THERAPY | Age: 89
Discharge: HOME | End: 2025-05-22
Payer: MEDICARE

## 2025-05-22 ENCOUNTER — OFFICE VISIT (OUTPATIENT)
Dept: ONCOLOGY | Age: 89
End: 2025-05-22
Payer: MEDICARE

## 2025-05-22 VITALS
SYSTOLIC BLOOD PRESSURE: 123 MMHG | OXYGEN SATURATION: 98 % | HEIGHT: 70 IN | TEMPERATURE: 97.6 F | HEART RATE: 59 BPM | WEIGHT: 168.6 LBS | DIASTOLIC BLOOD PRESSURE: 67 MMHG | BODY MASS INDEX: 24.14 KG/M2

## 2025-05-22 DIAGNOSIS — D64.9 ANEMIA, UNSPECIFIED TYPE: Primary | ICD-10-CM

## 2025-05-22 DIAGNOSIS — D64.9 ANEMIA, UNSPECIFIED TYPE: ICD-10-CM

## 2025-05-22 LAB
ABO + RH BLD: NORMAL
ARM BAND NUMBER: NORMAL
BASOPHILS # BLD: 0.16 K/UL (ref 0–0.2)
BASOPHILS NFR BLD: 4 % (ref 0–2)
BILIRUB DIRECT SERPL-MCNC: 0.4 MG/DL (ref 0–0.3)
BILIRUB INDIRECT SERPL-MCNC: 1.1 MG/DL (ref 0–1)
BILIRUB SERPL-MCNC: 1.5 MG/DL (ref 0–1.2)
BLOOD BANK SAMPLE EXPIRATION: NORMAL
BLOOD GROUP ANTIBODIES SERPL: NEGATIVE
DAT POLY-SP REAG RBC QL: NEGATIVE
EOSINOPHIL # BLD: 0.16 K/UL (ref 0.05–0.5)
EOSINOPHILS RELATIVE PERCENT: 4 % (ref 0–6)
ERYTHROCYTE [DISTWIDTH] IN BLOOD BY AUTOMATED COUNT: 27.7 % (ref 11.5–15)
FREE KAPPA/LAMBDA RATIO: 1.91 (ref 0.22–1.74)
HCT VFR BLD AUTO: 26.2 % (ref 37–54)
HGB BLD-MCNC: 8.4 G/DL (ref 12.5–16.5)
IMM RETICS NFR: 23.1 % (ref 2.3–13.4)
KAPPA LC FREE SER-MCNC: 38.5 MG/L
LAMBDA LC FREE SERPL-MCNC: 20.2 MG/L (ref 4.2–27.7)
LDH SERPL-CCNC: 209 U/L (ref 135–225)
LYMPHOCYTES NFR BLD: 2.01 K/UL (ref 1.5–4)
LYMPHOCYTES RELATIVE PERCENT: 44 % (ref 20–42)
MCH RBC QN AUTO: 35.6 PG (ref 26–35)
MCHC RBC AUTO-ENTMCNC: 32.1 G/DL (ref 32–34.5)
MCV RBC AUTO: 111 FL (ref 80–99.9)
METAMYELOCYTES ABSOLUTE COUNT: 0.04 K/UL (ref 0–0.12)
METAMYELOCYTES: 1 % (ref 0–1)
MONOCYTES NFR BLD: 0.41 K/UL (ref 0.1–0.95)
MONOCYTES NFR BLD: 9 % (ref 2–12)
MYELOCYTES ABSOLUTE COUNT: 0.12 K/UL
MYELOCYTES: 3 %
NEUTROPHILS NFR BLD: 37 % (ref 43–80)
NEUTS SEG NFR BLD: 1.68 K/UL (ref 1.8–7.3)
NUCLEATED RED BLOOD CELLS: 3 PER 100 WBC
PLATELET # BLD AUTO: 253 K/UL (ref 130–450)
PMV BLD AUTO: 12.7 FL (ref 7–12)
RBC # BLD AUTO: 2.36 M/UL (ref 3.8–5.8)
RBC # BLD: ABNORMAL 10*6/UL
RETIC HEMOGLOBIN: 33.2 PG (ref 28.2–36.6)
RETICS # AUTO: 0.13 M/UL
RETICS/RBC NFR AUTO: 5.7 % (ref 0.4–1.9)
WBC OTHER # BLD: 4.6 K/UL (ref 4.5–11.5)

## 2025-05-22 PROCEDURE — 83615 LACTATE (LD) (LDH) ENZYME: CPT

## 2025-05-22 PROCEDURE — 86850 RBC ANTIBODY SCREEN: CPT

## 2025-05-22 PROCEDURE — 84155 ASSAY OF PROTEIN SERUM: CPT

## 2025-05-22 PROCEDURE — 84165 PROTEIN E-PHORESIS SERUM: CPT

## 2025-05-22 PROCEDURE — 86900 BLOOD TYPING SEROLOGIC ABO: CPT

## 2025-05-22 PROCEDURE — 85045 AUTOMATED RETICULOCYTE COUNT: CPT

## 2025-05-22 PROCEDURE — 86880 COOMBS TEST DIRECT: CPT

## 2025-05-22 PROCEDURE — 83521 IG LIGHT CHAINS FREE EACH: CPT

## 2025-05-22 PROCEDURE — 82248 BILIRUBIN DIRECT: CPT

## 2025-05-22 PROCEDURE — 82247 BILIRUBIN TOTAL: CPT

## 2025-05-22 PROCEDURE — 36415 COLL VENOUS BLD VENIPUNCTURE: CPT

## 2025-05-22 PROCEDURE — 86901 BLOOD TYPING SEROLOGIC RH(D): CPT

## 2025-05-22 PROCEDURE — 85025 COMPLETE CBC W/AUTO DIFF WBC: CPT

## 2025-05-22 NOTE — PROGRESS NOTES
End Date Taking? Authorizing Provider   Omega-3 Fatty Acids (FISH OIL) 300 MG CAPS Take by mouth   Yes Guadalupe Mchugh MD   Upadacitinib ER (RINVOQ) 15 MG TB24 Take 1 tablet by mouth 12/19/24  Yes Guadalupe Mchugh MD   triamcinolone (KENALOG) 0.025 % cream Apply Topically to affected ear(s) 2-3 times weekly for dryness/itching. Stop when symptoms resolved 8/24/23  Yes Braxton Hines APRN - CNP   Ascorbic Acid (VITAMIN C) 1000 MG tablet Take by mouth daily   Yes ProviderGuadalupe MD   levothyroxine (SYNTHROID) 100 MCG tablet Take 88 mcg by mouth Daily   Yes ProviderGuadalupe MD   omeprazole (PRILOSEC) 20 MG delayed release capsule Take 1 capsule by mouth in the morning and at bedtime   Yes Provider, MD Guadalupe   valACYclovir (VALTREX) 500 MG tablet Take 1 tablet by mouth as needed   Yes ProviderGuadalupe MD   brompheniramine-pseudoephedrine-DM 30-2-10 MG/5ML syrup Take 5 mLs by mouth 4 times daily as needed. 8/20/12  Yes Bernadette Smith MD   simvastatin (ZOCOR) 20 MG tablet Take 1 tablet by mouth nightly   Yes ProviderGuadalupe MD   celecoxib (CELEBREX) 200 MG capsule Take 1 capsule by mouth 2 times daily   Yes ProviderGuadalupe MD   predniSONE (DELTASONE) 20 MG tablet Take 1 tablet by mouth daily  Patient not taking: Reported on 5/22/2025 10/9/24   Guadalupe Mchugh MD       Allergies  Allergies   Allergen Reactions    Clindamycin/Lincomycin Rash    Erythromycin Rash     Allergy to all mycin's    Pcn [Penicillins] Rash       Review of Systems:      Objective  /67   Pulse 59   Temp 97.6 °F (36.4 °C)   Ht 1.778 m (5' 10\")   Wt 76.5 kg (168 lb 9.6 oz)   SpO2 98%   BMI 24.19 kg/m²     Physical Performance Status    Physical Exam:  General: AAO to person, place, time, and purpose, appears stated age, cooperative, no acute distress, pleasant   Head and neck : PERRLA, EOMI . Sclera non icteric.  Oropharynx : Clear  Neck: no JVD,  no adenopathy, no carotid

## 2025-05-23 LAB
ALBUMIN SERPL-MCNC: 3.7 G/DL (ref 3.5–4.7)
ALPHA1 GLOB SERPL ELPH-MCNC: 0.3 G/DL (ref 0.2–0.4)
ALPHA2 GLOB SERPL ELPH-MCNC: 0.5 G/DL (ref 0.5–1)
B-GLOBULIN SERPL ELPH-MCNC: 0.9 G/DL (ref 0.8–1.3)
GAMMA GLOB SERPL ELPH-MCNC: 1.4 G/DL (ref 0.7–1.6)
PATHOLOGIST: NORMAL
PROT PATTERN SERPL ELPH-IMP: NORMAL
PROT SERPL-MCNC: 6.8 G/DL (ref 6.4–8.3)

## 2025-05-29 ENCOUNTER — HOSPITAL ENCOUNTER (OUTPATIENT)
Dept: INFUSION THERAPY | Age: 89
Discharge: HOME | End: 2025-05-29
Payer: MEDICARE

## 2025-05-29 DIAGNOSIS — D64.9 ANEMIA, UNSPECIFIED TYPE: ICD-10-CM

## 2025-05-29 LAB
BASOPHILS # BLD: 0.3 K/UL (ref 0–0.2)
BASOPHILS NFR BLD: 7 % (ref 0–2)
EOSINOPHIL # BLD: 0.13 K/UL (ref 0.05–0.5)
EOSINOPHILS RELATIVE PERCENT: 3 % (ref 0–6)
ERYTHROCYTE [DISTWIDTH] IN BLOOD BY AUTOMATED COUNT: 26.5 % (ref 11.5–15)
HCT VFR BLD AUTO: 25.3 % (ref 37–54)
HGB BLD-MCNC: 8.4 G/DL (ref 12.5–16.5)
LYMPHOCYTES NFR BLD: 2.24 K/UL (ref 1.5–4)
LYMPHOCYTES RELATIVE PERCENT: 52 % (ref 20–42)
MCH RBC QN AUTO: 36.5 PG (ref 26–35)
MCHC RBC AUTO-ENTMCNC: 33.2 G/DL (ref 32–34.5)
MCV RBC AUTO: 110 FL (ref 80–99.9)
MONOCYTES NFR BLD: 0.73 K/UL (ref 0.1–0.95)
MONOCYTES NFR BLD: 17 % (ref 2–12)
NEUTROPHILS NFR BLD: 21 % (ref 43–80)
NEUTS SEG NFR BLD: 0.9 K/UL (ref 1.8–7.3)
NUCLEATED RED BLOOD CELLS: 2 PER 100 WBC
PLATELET # BLD AUTO: 229 K/UL (ref 130–450)
PMV BLD AUTO: 11.4 FL (ref 7–12)
RBC # BLD AUTO: 2.3 M/UL (ref 3.8–5.8)
RBC # BLD: ABNORMAL 10*6/UL
WBC OTHER # BLD: 4.3 K/UL (ref 4.5–11.5)

## 2025-05-29 PROCEDURE — 36415 COLL VENOUS BLD VENIPUNCTURE: CPT

## 2025-05-29 PROCEDURE — 85025 COMPLETE CBC W/AUTO DIFF WBC: CPT

## 2025-06-05 ENCOUNTER — HOSPITAL ENCOUNTER (OUTPATIENT)
Dept: INFUSION THERAPY | Age: 89
Discharge: HOME | End: 2025-06-05
Payer: MEDICARE

## 2025-06-05 DIAGNOSIS — D64.9 ANEMIA, UNSPECIFIED TYPE: ICD-10-CM

## 2025-06-05 LAB
BASOPHILS # BLD: 0.1 K/UL (ref 0–0.2)
BASOPHILS NFR BLD: 2 % (ref 0–2)
EOSINOPHIL # BLD: 0.58 K/UL (ref 0.05–0.5)
EOSINOPHILS RELATIVE PERCENT: 11 % (ref 0–6)
ERYTHROCYTE [DISTWIDTH] IN BLOOD BY AUTOMATED COUNT: 26.1 % (ref 11.5–15)
HCT VFR BLD AUTO: 25.3 % (ref 37–54)
HGB BLD-MCNC: 8.3 G/DL (ref 12.5–16.5)
LYMPHOCYTES NFR BLD: 2.12 K/UL (ref 1.5–4)
LYMPHOCYTES RELATIVE PERCENT: 39 % (ref 20–42)
MCH RBC QN AUTO: 36.1 PG (ref 26–35)
MCHC RBC AUTO-ENTMCNC: 32.8 G/DL (ref 32–34.5)
MCV RBC AUTO: 110 FL (ref 80–99.9)
MONOCYTES NFR BLD: 0.48 K/UL (ref 0.1–0.95)
MONOCYTES NFR BLD: 9 % (ref 2–12)
MYELOCYTES ABSOLUTE COUNT: 0.1 K/UL
MYELOCYTES: 2 %
NEUTROPHILS NFR BLD: 39 % (ref 43–80)
NEUTS SEG NFR BLD: 2.12 K/UL (ref 1.8–7.3)
NUCLEATED RED BLOOD CELLS: 1 PER 100 WBC
PLATELET # BLD AUTO: 294 K/UL (ref 130–450)
PMV BLD AUTO: 11.5 FL (ref 7–12)
RBC # BLD AUTO: 2.3 M/UL (ref 3.8–5.8)
RBC # BLD: ABNORMAL 10*6/UL
WBC OTHER # BLD: 5.5 K/UL (ref 4.5–11.5)

## 2025-06-05 PROCEDURE — 36415 COLL VENOUS BLD VENIPUNCTURE: CPT

## 2025-06-05 PROCEDURE — 85025 COMPLETE CBC W/AUTO DIFF WBC: CPT

## 2025-06-06 RX ORDER — CHOLECALCIFEROL (VITAMIN D3) 125 MCG
CAPSULE ORAL DAILY
COMMUNITY

## 2025-06-06 RX ORDER — GARLIC 200 MG
1000 TABLET ORAL DAILY
COMMUNITY

## 2025-06-06 RX ORDER — ACETAMINOPHEN 500 MG
500 TABLET ORAL EVERY 6 HOURS PRN
COMMUNITY

## 2025-06-10 ENCOUNTER — HOSPITAL ENCOUNTER (OUTPATIENT)
Dept: CT IMAGING | Age: 89
Discharge: HOME | End: 2025-06-10
Payer: MEDICARE

## 2025-06-10 VITALS
TEMPERATURE: 97.5 F | WEIGHT: 168 LBS | OXYGEN SATURATION: 97 % | DIASTOLIC BLOOD PRESSURE: 45 MMHG | RESPIRATION RATE: 18 BRPM | SYSTOLIC BLOOD PRESSURE: 111 MMHG | HEIGHT: 70 IN | HEART RATE: 53 BPM | BODY MASS INDEX: 24.05 KG/M2

## 2025-06-10 DIAGNOSIS — D64.9 ANEMIA, UNSPECIFIED TYPE: ICD-10-CM

## 2025-06-10 LAB
ERYTHROCYTE [DISTWIDTH] IN BLOOD BY AUTOMATED COUNT: 27 % (ref 11.5–15)
HCT VFR BLD AUTO: 25.9 % (ref 37–54)
HGB BLD-MCNC: 8.6 G/DL (ref 12.5–16.5)
INR PPP: 1.3
MCH RBC QN AUTO: 36.1 PG (ref 26–35)
MCHC RBC AUTO-ENTMCNC: 33.2 G/DL (ref 32–34.5)
MCV RBC AUTO: 108.8 FL (ref 80–99.9)
PARTIAL THROMBOPLASTIN TIME: 31.1 SEC (ref 24.5–35.1)
PLATELET # BLD AUTO: 301 K/UL (ref 130–450)
PMV BLD AUTO: 11.5 FL (ref 7–12)
PROTHROMBIN TIME: 13.9 SEC (ref 9.3–12.4)
RBC # BLD AUTO: 2.38 M/UL (ref 3.8–5.8)
WBC OTHER # BLD: 5.8 K/UL (ref 4.5–11.5)

## 2025-06-10 PROCEDURE — 85610 PROTHROMBIN TIME: CPT

## 2025-06-10 PROCEDURE — 85027 COMPLETE CBC AUTOMATED: CPT

## 2025-06-10 PROCEDURE — 38222 DX BONE MARROW BX & ASPIR: CPT

## 2025-06-10 PROCEDURE — 6360000002 HC RX W HCPCS: Performed by: RADIOLOGY

## 2025-06-10 PROCEDURE — 77012 CT SCAN FOR NEEDLE BIOPSY: CPT

## 2025-06-10 PROCEDURE — 85730 THROMBOPLASTIN TIME PARTIAL: CPT

## 2025-06-10 RX ORDER — SODIUM CHLORIDE 0.9 % (FLUSH) 0.9 %
5-40 SYRINGE (ML) INJECTION PRN
Status: DISCONTINUED | OUTPATIENT
Start: 2025-06-10 | End: 2025-06-11 | Stop reason: HOSPADM

## 2025-06-10 RX ORDER — MIDAZOLAM HYDROCHLORIDE 5 MG/ML
INJECTION, SOLUTION INTRAMUSCULAR; INTRAVENOUS PRN
Status: COMPLETED | OUTPATIENT
Start: 2025-06-10 | End: 2025-06-10

## 2025-06-10 RX ORDER — FENTANYL CITRATE 0.05 MG/ML
INJECTION, SOLUTION INTRAMUSCULAR; INTRAVENOUS PRN
Status: COMPLETED | OUTPATIENT
Start: 2025-06-10 | End: 2025-06-10

## 2025-06-10 RX ADMIN — MIDAZOLAM HYDROCHLORIDE 0.5 MG: 5 INJECTION, SOLUTION INTRAMUSCULAR; INTRAVENOUS at 09:04

## 2025-06-10 RX ADMIN — FENTANYL CITRATE 25 MCG: 50 INJECTION INTRAMUSCULAR; INTRAVENOUS at 09:04

## 2025-06-10 RX ADMIN — FENTANYL CITRATE 25 MCG: 50 INJECTION INTRAMUSCULAR; INTRAVENOUS at 09:01

## 2025-06-10 RX ADMIN — MIDAZOLAM HYDROCHLORIDE 1 MG: 5 INJECTION, SOLUTION INTRAMUSCULAR; INTRAVENOUS at 09:01

## 2025-06-10 ASSESSMENT — PAIN - FUNCTIONAL ASSESSMENT
PAIN_FUNCTIONAL_ASSESSMENT: NONE - DENIES PAIN

## 2025-06-10 NOTE — PROGRESS NOTES
Patient came down to special procedures for ct guided bone marrow biopsy and aspiration.  Patient was educated about the amount of radiation used with today's procedure.  Patient has been NPO since midnight    Patient taken to Cat Scan.  Patient positioned prone , secured on Cat Scan table with O2 and monitoring devices attached.     Patient scanned and images reviewed by Dr Hoover     Patient prepped secured and draped.     Emotional support given.    0901- sedation medication given    0904 - Procedure start /66 58 100 3LO2 15 RR    With the guidance of Cat Scan, needle inserted and two purple tubes and two green tubes filled and one - 12 gauge core biopsies taken by Dr. Hoover     Patient re-scanned and images reviewed by     Puncture site cleansed and dry sterile dressing of folded 4 x 4 and Tegaderm applied to back.      0912 Procedure completed VS  95/67 56 15 % NC3L    Biopsy sample taken to laboratory.     0930 - 15 minutes post procedure /52 52 13 100 3LNC  no complaints of pain at puncture site. Dressing CDI      See sedation documentation for vital signs    Total amount of sedation medication given during procedure: 1.5 mg of IV Versed and 50 mcg of IV Fentanyl    Nurse to nurse called, spoke with [facility] RN, notified nurse of above information, nurse assumed post sedation vital signs    Patient transported back to [facility] 5  
AMBULATORY CARE if you have any further questions.   Pre Admit Testing           [phone]     [facility] [phone]

## 2025-06-18 DIAGNOSIS — D64.9 ANEMIA, UNSPECIFIED TYPE: Primary | ICD-10-CM

## 2025-06-19 ENCOUNTER — HOSPITAL ENCOUNTER (OUTPATIENT)
Dept: INFUSION THERAPY | Age: 89
Discharge: HOME | End: 2025-06-19
Payer: MEDICARE

## 2025-06-19 ENCOUNTER — OFFICE VISIT (OUTPATIENT)
Dept: ONCOLOGY | Age: 89
End: 2025-06-19

## 2025-06-19 VITALS
HEIGHT: 70 IN | BODY MASS INDEX: 23.86 KG/M2 | HEART RATE: 66 BPM | OXYGEN SATURATION: 96 % | DIASTOLIC BLOOD PRESSURE: 61 MMHG | SYSTOLIC BLOOD PRESSURE: 114 MMHG | WEIGHT: 166.7 LBS | TEMPERATURE: 97 F

## 2025-06-19 DIAGNOSIS — D64.9 ANEMIA, UNSPECIFIED TYPE: ICD-10-CM

## 2025-06-19 DIAGNOSIS — D64.9 ANEMIA, UNSPECIFIED TYPE: Primary | ICD-10-CM

## 2025-06-19 LAB
ALBUMIN SERPL-MCNC: 4.5 G/DL (ref 3.5–5.2)
ALP SERPL-CCNC: 85 U/L (ref 40–129)
ALT SERPL-CCNC: 18 U/L (ref 0–50)
ANION GAP SERPL CALCULATED.3IONS-SCNC: 9 MMOL/L (ref 7–16)
AST SERPL-CCNC: 20 U/L (ref 0–50)
BASOPHILS # BLD: 0.34 K/UL (ref 0–0.2)
BASOPHILS NFR BLD: 6 % (ref 0–2)
BILIRUB SERPL-MCNC: 1.8 MG/DL (ref 0–1.2)
BUN SERPL-MCNC: 16 MG/DL (ref 8–23)
CALCIUM SERPL-MCNC: 9.1 MG/DL (ref 8.8–10.2)
CHLORIDE SERPL-SCNC: 103 MMOL/L (ref 98–107)
CO2 SERPL-SCNC: 26 MMOL/L (ref 22–29)
CREAT SERPL-MCNC: 0.9 MG/DL (ref 0.7–1.2)
EOSINOPHIL # BLD: 0.73 K/UL (ref 0.05–0.5)
EOSINOPHILS RELATIVE PERCENT: 13 % (ref 0–6)
ERYTHROCYTE [DISTWIDTH] IN BLOOD BY AUTOMATED COUNT: 26.2 % (ref 11.5–15)
GFR, ESTIMATED: 81 ML/MIN/1.73M2
GLUCOSE SERPL-MCNC: 113 MG/DL (ref 74–99)
HCT VFR BLD AUTO: 25.4 % (ref 37–54)
HGB BLD-MCNC: 8.4 G/DL (ref 12.5–16.5)
LYMPHOCYTES NFR BLD: 2.74 K/UL (ref 1.5–4)
LYMPHOCYTES RELATIVE PERCENT: 49 % (ref 20–42)
MCH RBC QN AUTO: 36.1 PG (ref 26–35)
MCHC RBC AUTO-ENTMCNC: 33.1 G/DL (ref 32–34.5)
MCV RBC AUTO: 109 FL (ref 80–99.9)
METAMYELOCYTES ABSOLUTE COUNT: 0.06 K/UL (ref 0–0.12)
METAMYELOCYTES: 1 % (ref 0–1)
MONOCYTES NFR BLD: 0.56 K/UL (ref 0.1–0.95)
MONOCYTES NFR BLD: 10 % (ref 2–12)
MYELOCYTES ABSOLUTE COUNT: 0.06 K/UL
MYELOCYTES: 1 %
NEUTROPHILS NFR BLD: 20 % (ref 43–80)
NEUTS SEG NFR BLD: 1.12 K/UL (ref 1.8–7.3)
PLATELET # BLD AUTO: 296 K/UL (ref 130–450)
PMV BLD AUTO: 12.1 FL (ref 7–12)
POTASSIUM SERPL-SCNC: 4.1 MMOL/L (ref 3.5–5.1)
PROT SERPL-MCNC: 7.1 G/DL (ref 6.4–8.3)
RBC # BLD AUTO: 2.33 M/UL (ref 3.8–5.8)
RBC # BLD: ABNORMAL 10*6/UL
SODIUM SERPL-SCNC: 139 MMOL/L (ref 136–145)
WBC OTHER # BLD: 5.6 K/UL (ref 4.5–11.5)

## 2025-06-19 PROCEDURE — 36415 COLL VENOUS BLD VENIPUNCTURE: CPT

## 2025-06-19 PROCEDURE — 80053 COMPREHEN METABOLIC PANEL: CPT

## 2025-06-19 PROCEDURE — 85025 COMPLETE CBC W/AUTO DIFF WBC: CPT

## 2025-06-19 NOTE — PROGRESS NOTES
[facility]  [address] [place] [address]   Hematology/Oncology  Consult      Patient Name: Sonido Medina  YOB: 1934  PCP: Braxton Perrin MD   Referring Provider:      Reason for Consultation:   Chief Complaint   Patient presents with    Anemia    Follow-up        History of Present Illness:  89-year-old male referred for anemia evaluation.  Labs from April 2025 show a hemoglobin of 7.5 with an MCV of 105, WBC 5.3, platelets 326.  WBC differential notable for mild neutropenia ANC of 1000.  Absolute reticulocyte count 25,000.  Chemistry unremarkable ferritin 680 iron saturation 96%.  Folate and B12 normal.  TSH normal.    Patient reports normal blood count in April last year.  Started Rinvoq in November 2024.  Noted anemia for the first time a month after starting Rinvoq.    Peripheral blood flow cytometry in May 2025 showed 6% monoclonal CD5 and 10 negative B cells.    Review of systems: Over 10 systems were reviewed and all were negative except as mentioned above.      Diagnostic Data:     Past Medical History:   Diagnosis Date    Anemia     Arthritis     History of blood transfusion     possible    Hyperlipidemia     Keratosis 08/10/2023    face       Patient Active Problem List    Diagnosis Date Noted    Anemia 05/08/2025        Past Surgical History:   Procedure Laterality Date    ARM SURGERY Left 1940    BACK SURGERY N/A 2002    x2; L4-S1;  then 10 years later L5    KNEE SURGERY Right     replacement    NOSE SURGERY      broken nose    SKIN BIOPSY      multiple    TONSILLECTOMY         Family History  Family History   Problem Relation Age of Onset    Breast Cancer Mother     Cancer Father         throat    Breast Cancer Sister     Cancer Brother         colon    Cancer Brother         prostate       Social History    TOBACCO:   reports that he has never smoked. He has never used smokeless tobacco.  ETOH:   reports current alcohol use of about 4.0 standard drinks of

## 2025-06-26 LAB — BONE MARROW REPORT: NORMAL

## 2025-06-27 ENCOUNTER — HOSPITAL ENCOUNTER (OUTPATIENT)
Dept: CT IMAGING | Age: 89
Discharge: HOME | End: 2025-06-29
Attending: INTERNAL MEDICINE
Payer: MEDICARE

## 2025-06-27 DIAGNOSIS — D64.9 ANEMIA, UNSPECIFIED TYPE: ICD-10-CM

## 2025-06-27 PROCEDURE — 74177 CT ABD & PELVIS W/CONTRAST: CPT

## 2025-06-27 PROCEDURE — 6360000004 HC RX CONTRAST MEDICATION: Performed by: SPECIALIST

## 2025-06-27 PROCEDURE — 71260 CT THORAX DX C+: CPT

## 2025-06-27 RX ORDER — IOPAMIDOL 755 MG/ML
75 INJECTION, SOLUTION INTRAVASCULAR
Status: COMPLETED | OUTPATIENT
Start: 2025-06-27 | End: 2025-06-27

## 2025-06-27 RX ADMIN — IOPAMIDOL 75 ML: 755 INJECTION, SOLUTION INTRAVENOUS at 10:22

## 2025-07-10 ENCOUNTER — HOSPITAL ENCOUNTER (OUTPATIENT)
Dept: INFUSION THERAPY | Age: 89
Discharge: HOME | End: 2025-07-10
Payer: MEDICARE

## 2025-07-10 ENCOUNTER — OFFICE VISIT (OUTPATIENT)
Dept: ONCOLOGY | Age: 89
End: 2025-07-10

## 2025-07-10 VITALS
HEART RATE: 66 BPM | DIASTOLIC BLOOD PRESSURE: 61 MMHG | HEIGHT: 69 IN | OXYGEN SATURATION: 97 % | SYSTOLIC BLOOD PRESSURE: 128 MMHG | WEIGHT: 165 LBS | TEMPERATURE: 98.1 F | BODY MASS INDEX: 24.44 KG/M2

## 2025-07-10 DIAGNOSIS — D64.9 ANEMIA, UNSPECIFIED TYPE: Primary | ICD-10-CM

## 2025-07-10 DIAGNOSIS — D64.9 ANEMIA, UNSPECIFIED TYPE: ICD-10-CM

## 2025-07-10 LAB — DAT POLY-SP REAG RBC QL: NEGATIVE

## 2025-07-10 PROCEDURE — 80053 COMPREHEN METABOLIC PANEL: CPT

## 2025-07-10 PROCEDURE — 86880 COOMBS TEST DIRECT: CPT

## 2025-07-10 PROCEDURE — 85045 AUTOMATED RETICULOCYTE COUNT: CPT

## 2025-07-10 PROCEDURE — 83010 ASSAY OF HAPTOGLOBIN QUANT: CPT

## 2025-07-10 PROCEDURE — 85025 COMPLETE CBC W/AUTO DIFF WBC: CPT

## 2025-07-10 PROCEDURE — 36415 COLL VENOUS BLD VENIPUNCTURE: CPT

## 2025-07-10 NOTE — PROGRESS NOTES
unremarkable and visualized in the right lower quadrant. Pelvis: No suspicious pelvic lesion or bulky pelvic adenopathy/free fluid. Peritoneum/Retroperitoneum: Patent atherosclerotic nonaneurysmal abdominal aorta.  No bulky retroperitoneal adenopathy. Bones/Soft Tissues: No acute osseous or soft tissue findings.  Fat containing bilateral direct inguinal hernias.  No bulky superficial inguinal adenopathy.     1. No evidence of lymphadenopathy in the chest, abdomen, or pelvis. 2. No splenomegaly. 3. Bilateral renal angiomyolipomas. 4. Nonobstructing right nephrolithiasis. 5. Fat containing bilateral direct inguinal hernias. 6. Gallbladder sludge or microlithiasis.     CT ABDOMEN PELVIS W IV CONTRAST Additional Contrast? None  Result Date: 6/27/2025  EXAMINATION: CT OF THE CHEST WITH CONTRAST; CT OF THE ABDOMEN AND PELVIS WITH CONTRAST 6/27/2025 10:14 am TECHNIQUE: CT of the chest was performed with the administration of intravenous contrast. Multiplanar reformatted images are provided for review. Automated exposure control, iterative reconstruction, and/or weight based adjustment of the mA/kV was utilized to reduce the radiation dose to as low as reasonably achievable.; CT of the abdomen and pelvis was performed with the administration of intravenous contrast. Multiplanar reformatted images are provided for review. Automated exposure control, iterative reconstruction, and/or weight based adjustment of the mA/kV was utilized to reduce the radiation dose to as low as reasonably achievable. COMPARISON: None. 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 R/O pathologic lymphadenopathy. What reading provider will be dictating this exam?->CRC; ORDERING SYSTEM PROVIDED HISTORY: Anemia, unspecified type TECHNOLOGIST PROVIDED HISTORY: Additional Contrast?->None